# Patient Record
Sex: FEMALE | Race: WHITE | NOT HISPANIC OR LATINO | Employment: FULL TIME | URBAN - METROPOLITAN AREA
[De-identification: names, ages, dates, MRNs, and addresses within clinical notes are randomized per-mention and may not be internally consistent; named-entity substitution may affect disease eponyms.]

---

## 2022-08-04 PROBLEM — E66.812 CLASS 2 OBESITY DUE TO EXCESS CALORIES WITHOUT SERIOUS COMORBIDITY WITH BODY MASS INDEX (BMI) OF 36.0 TO 36.9 IN ADULT: Status: ACTIVE | Noted: 2022-08-04

## 2022-11-14 PROBLEM — M54.41 CHRONIC BILATERAL LOW BACK PAIN WITH SCIATICA: Status: ACTIVE | Noted: 2022-11-14

## 2022-11-14 PROBLEM — M46.1 SI JOINT ARTHRITIS: Status: ACTIVE | Noted: 2022-11-14

## 2022-11-14 PROBLEM — M54.42 CHRONIC BILATERAL LOW BACK PAIN WITH SCIATICA: Status: ACTIVE | Noted: 2022-11-14

## 2024-02-20 PROBLEM — E66.812 CLASS 2 OBESITY DUE TO EXCESS CALORIES WITHOUT SERIOUS COMORBIDITY WITH BODY MASS INDEX (BMI) OF 36.0 TO 36.9 IN ADULT: Status: RESOLVED | Noted: 2022-08-04 | Resolved: 2024-02-20

## 2024-09-06 ENCOUNTER — TELEPHONE (OUTPATIENT)
Dept: FAMILY MEDICINE CLINIC | Facility: CLINIC | Age: 44
End: 2024-09-06
Payer: COMMERCIAL

## 2024-09-06 RX ORDER — ONDANSETRON 4 MG/1
4 TABLET, ORALLY DISINTEGRATING ORAL EVERY 8 HOURS PRN
Qty: 30 TABLET | Refills: 0 | Status: SHIPPED | OUTPATIENT
Start: 2024-09-06

## 2024-09-10 RX ORDER — CIPROFLOXACIN 500 MG/1
500 TABLET, FILM COATED ORAL 2 TIMES DAILY
Qty: 14 TABLET | Refills: 0 | Status: SHIPPED | OUTPATIENT
Start: 2024-09-10 | End: 2024-09-10 | Stop reason: SDUPTHER

## 2024-09-10 RX ORDER — CIPROFLOXACIN 500 MG/1
500 TABLET, FILM COATED ORAL 2 TIMES DAILY
Qty: 14 TABLET | Refills: 0 | Status: SHIPPED | OUTPATIENT
Start: 2024-09-10 | End: 2024-09-17

## 2024-09-18 DIAGNOSIS — N39.0 URINARY TRACT INFECTION WITHOUT HEMATURIA, SITE UNSPECIFIED: Primary | ICD-10-CM

## 2024-09-20 LAB
BACTERIA UR CULT: NO GROWTH
BACTERIA UR CULT: NORMAL

## 2024-10-16 DIAGNOSIS — E11.9 CONTROLLED TYPE 2 DIABETES MELLITUS WITHOUT COMPLICATION, WITHOUT LONG-TERM CURRENT USE OF INSULIN: ICD-10-CM

## 2024-10-16 RX ORDER — SEMAGLUTIDE 2.68 MG/ML
2 INJECTION, SOLUTION SUBCUTANEOUS WEEKLY
Qty: 9 ML | Refills: 0 | Status: SHIPPED | OUTPATIENT
Start: 2024-10-16

## 2024-10-25 ENCOUNTER — FLU SHOT (OUTPATIENT)
Dept: FAMILY MEDICINE CLINIC | Facility: CLINIC | Age: 44
End: 2024-10-25
Payer: COMMERCIAL

## 2024-10-25 DIAGNOSIS — Z23 NEED FOR INFLUENZA VACCINATION: Primary | ICD-10-CM

## 2024-10-25 PROCEDURE — 90656 IIV3 VACC NO PRSV 0.5 ML IM: CPT | Performed by: FAMILY MEDICINE

## 2024-10-25 PROCEDURE — 90471 IMMUNIZATION ADMIN: CPT | Performed by: FAMILY MEDICINE

## 2024-11-12 DIAGNOSIS — S69.91XA INJURY OF RIGHT WRIST, INITIAL ENCOUNTER: Primary | ICD-10-CM

## 2024-11-14 ENCOUNTER — HOSPITAL ENCOUNTER (OUTPATIENT)
Dept: GENERAL RADIOLOGY | Facility: HOSPITAL | Age: 44
Discharge: HOME | End: 2024-11-14
Payer: COMMERCIAL

## 2024-11-14 ENCOUNTER — LAB (OUTPATIENT)
Dept: LAB | Facility: HOSPITAL | Age: 44
End: 2024-11-14
Payer: COMMERCIAL

## 2024-11-14 LAB
ABO GROUP BLD: NORMAL
ANION GAP SERPL CALCULATED.3IONS-SCNC: 10.1 MMOL/L (ref 5–15)
BLD GP AB SCN SERPL QL: NEGATIVE
BUN SERPL-MCNC: 13 MG/DL (ref 6–20)
BUN/CREAT SERPL: 15.3 (ref 7–25)
CALCIUM SPEC-SCNC: 9.1 MG/DL (ref 8.6–10.5)
CHLORIDE SERPL-SCNC: 105 MMOL/L (ref 98–107)
CO2 SERPL-SCNC: 23.9 MMOL/L (ref 22–29)
CREAT SERPL-MCNC: 0.85 MG/DL (ref 0.57–1)
DEPRECATED RDW RBC AUTO: 44.4 FL (ref 37–54)
EGFRCR SERPLBLD CKD-EPI 2021: 87.8 ML/MIN/1.73
ERYTHROCYTE [DISTWIDTH] IN BLOOD BY AUTOMATED COUNT: 13.8 % (ref 12.3–15.4)
GLUCOSE SERPL-MCNC: 75 MG/DL (ref 65–99)
HCG INTACT+B SERPL-ACNC: <1 MIU/ML
HCT VFR BLD AUTO: 40.6 % (ref 34–46.6)
HGB BLD-MCNC: 12.8 G/DL (ref 12–15.9)
MCH RBC QN AUTO: 28.1 PG (ref 26.6–33)
MCHC RBC AUTO-ENTMCNC: 31.5 G/DL (ref 31.5–35.7)
MCV RBC AUTO: 89 FL (ref 79–97)
PLATELET # BLD AUTO: 386 10*3/MM3 (ref 140–450)
PMV BLD AUTO: 10.2 FL (ref 6–12)
POTASSIUM SERPL-SCNC: 4.1 MMOL/L (ref 3.5–5.2)
RBC # BLD AUTO: 4.56 10*6/MM3 (ref 3.77–5.28)
RH BLD: POSITIVE
SODIUM SERPL-SCNC: 139 MMOL/L (ref 136–145)
T&S EXPIRATION DATE: NORMAL
WBC NRBC COR # BLD AUTO: 7.25 10*3/MM3 (ref 3.4–10.8)

## 2024-11-14 PROCEDURE — 85027 COMPLETE CBC AUTOMATED: CPT

## 2024-11-14 PROCEDURE — 36415 COLL VENOUS BLD VENIPUNCTURE: CPT

## 2024-11-14 PROCEDURE — 86901 BLOOD TYPING SEROLOGIC RH(D): CPT

## 2024-11-14 PROCEDURE — 86900 BLOOD TYPING SEROLOGIC ABO: CPT

## 2024-11-14 PROCEDURE — 84702 CHORIONIC GONADOTROPIN TEST: CPT

## 2024-11-14 PROCEDURE — 71046 X-RAY EXAM CHEST 2 VIEWS: CPT

## 2024-11-14 PROCEDURE — 86850 RBC ANTIBODY SCREEN: CPT

## 2024-11-14 PROCEDURE — 80048 BASIC METABOLIC PNL TOTAL CA: CPT

## 2024-11-24 ENCOUNTER — PREP FOR SURGERY (OUTPATIENT)
Dept: OTHER | Facility: HOSPITAL | Age: 44
End: 2024-11-24
Payer: COMMERCIAL

## 2024-11-24 NOTE — H&P
Gynecology History and Physical    Chief complaint Scheduled hysterectomy    Subjective     Patient is a 44 y.o. female presents with a longstanding history of heavy menses.  Has declined medical management.  She does not desire future childbearing and wants definitive surgery in the form of hysterectomy.  See office H&P there is no interval change.     Review of Systems   Pertinent items are noted in HPI    History    Surgical history:  LSC R distal salpingectomy d/t ectopic pregnancy    Obstetric history:   Term  x 2  Ectopic x 1  SAB x 1    Past Medical History:   Diagnosis Date    Acute left-sided low back pain with left-sided sciatica     Allergic     Annual physical exam 2021    Arthritis     Asthma     Body mass index (BMI) of 36.0 to 36.9 in adult     Body mass index (BMI) of 39.0 to 39.9 in adult 2022    Body mass index (BMI) of 39.0 to 39.9 in adult     Class 2 obesity due to excess calories without serious comorbidity with body mass index (BMI) of 36.0 to 36.9 in adult     Concern about STD in female without diagnosis     Depression     Elevated blood pressure reading in office without diagnosis of hypertension 2023    Elevated LDL cholesterol level 2023    Elevated temperature 2021    Encounter for general adult medical examination without abnormal findings 2013    Encounter for hepatitis C screening test for low risk patient     Encounter for screening for other disorder 2012    Establishing care with new doctor, encounter for 2021    Fatigue     Headache     High triglycerides 2023    Leg pain, right     Low back pain     Onychomycosis     Other fatigue     Screening cholesterol level 2022    Screening for diabetes mellitus     Screening for thyroid disorder     Screening mammogram for breast cancer     Sleepiness     Stress     Upper respiratory tract infection 2024    Wellness examination 2020     Past Surgical  History:   Procedure Laterality Date    EAR TUBES      ECTOPIC PREGNANCY Right 10/13/2014     Family History   Problem Relation Age of Onset    Cancer Mother         Cervix    Depression Mother     Bipolar disorder Mother     Cervical cancer Mother     Uterine cancer Mother     Hyperlipidemia Father     Diabetes Maternal Grandfather     Kidney disease Maternal Grandfather      Social History     Tobacco Use    Smoking status: Never     Passive exposure: Current    Smokeless tobacco: Never   Vaping Use    Vaping status: Never Used   Substance Use Topics    Alcohol use: Yes     Comment: Occasionally    Drug use: Never       Allergies  Allergies   Allergen Reactions    Codeine GI Intolerance    Measles, Mumps & Rubella Vac Swelling    Norco [Hydrocodone-Acetaminophen] GI Intolerance    Niacin Hives             Objective       Physical Exam:      General Appearance:    Alert, cooperative, in no acute distress   Abdomen:     Normal bowel sounds, no masses, soft non-tender, non-distended, no guarding, no rebound tenderness   Genitalia:    Uterus not enlarged, good descensus.    Extremities:   Moves all extremities well, no edema, no cyanosis, no              redness           Assessment & Plan   43 yo c menorrhagia desiring definitive surgical mgmt. Plan for LAVH c bilateral salpingectomy (has had R distal salpingectomy previously).      See office H&P for full informed consent there is no change.       Zhane Ca MD  11/24/24  07:36 EST

## 2024-11-25 ENCOUNTER — ANESTHESIA EVENT (OUTPATIENT)
Dept: PERIOP | Facility: HOSPITAL | Age: 44
End: 2024-11-25
Payer: COMMERCIAL

## 2024-11-25 NOTE — ANESTHESIA PREPROCEDURE EVALUATION
Anesthesia Evaluation     Patient summary reviewed and Nursing notes reviewed   NPO Solid Status: > 8 hours  NPO Liquid Status: > 8 hours           Airway   Mallampati: II  TM distance: >3 FB  Neck ROM: full  Possible difficult intubation  Dental - normal exam     Pulmonary - normal exam    breath sounds clear to auscultation  (+) lung cancer, asthma,recent URI, sleep apnea  Cardiovascular - normal exam  Exercise tolerance: unable to assess    ECG reviewed  Rhythm: regular  Rate: normal    (+) DVT, hyperlipidemia      Neuro/Psych  (+) headaches, numbness, psychiatric history Anxiety  GI/Hepatic/Renal/Endo    (+) obesity, diabetes mellitus    Musculoskeletal     (+) neck stiffness  Abdominal  - normal exam   Substance History - negative use     OB/GYN negative ob/gyn ROS         Other   arthritis,                 Anesthesia Plan    ASA 3     general     (Last dose Ozempic 15 Days ago.GETA)  intravenous induction     Anesthetic plan, risks, benefits, and alternatives have been provided, discussed and informed consent has been obtained with: patient.    CODE STATUS:         
normal...

## 2024-11-26 ENCOUNTER — HOSPITAL ENCOUNTER (OUTPATIENT)
Facility: HOSPITAL | Age: 44
Discharge: HOME | End: 2024-11-27
Attending: OBSTETRICS & GYNECOLOGY | Admitting: OBSTETRICS & GYNECOLOGY
Payer: COMMERCIAL

## 2024-11-26 ENCOUNTER — ANESTHESIA (OUTPATIENT)
Dept: PERIOP | Facility: HOSPITAL | Age: 44
End: 2024-11-26
Payer: COMMERCIAL

## 2024-11-26 DIAGNOSIS — Z90.710 STATUS POST VAGINAL HYSTERECTOMY: Primary | ICD-10-CM

## 2024-11-26 DIAGNOSIS — N92.0 EXCESSIVE OR FREQUENT MENSTRUATION: ICD-10-CM

## 2024-11-26 LAB — B-HCG UR QL: NEGATIVE

## 2024-11-26 PROCEDURE — 88307 TISSUE EXAM BY PATHOLOGIST: CPT | Performed by: OBSTETRICS & GYNECOLOGY

## 2024-11-26 PROCEDURE — 25010000002 HYDROMORPHONE 1 MG/ML SOLUTION: Performed by: NURSE ANESTHETIST, CERTIFIED REGISTERED

## 2024-11-26 PROCEDURE — G0378 HOSPITAL OBSERVATION PER HR: HCPCS

## 2024-11-26 PROCEDURE — 25010000002 BUPIVACAINE (PF) 0.25 % SOLUTION 30 ML VIAL: Performed by: OBSTETRICS & GYNECOLOGY

## 2024-11-26 PROCEDURE — 81025 URINE PREGNANCY TEST: CPT | Performed by: ANESTHESIOLOGY

## 2024-11-26 PROCEDURE — 25010000002 ONDANSETRON PER 1 MG: Performed by: NURSE ANESTHETIST, CERTIFIED REGISTERED

## 2024-11-26 PROCEDURE — 25810000003 LACTATED RINGERS PER 1000 ML: Performed by: OBSTETRICS & GYNECOLOGY

## 2024-11-26 PROCEDURE — 25010000002 MAGNESIUM SULFATE PER 500 MG OF MAGNESIUM: Performed by: NURSE ANESTHETIST, CERTIFIED REGISTERED

## 2024-11-26 PROCEDURE — 25810000003 SODIUM CHLORIDE 0.9 % SOLUTION: Performed by: OBSTETRICS & GYNECOLOGY

## 2024-11-26 PROCEDURE — 25010000002 MORPHINE PER 10 MG: Performed by: OBSTETRICS & GYNECOLOGY

## 2024-11-26 PROCEDURE — 25010000002 PROPOFOL 200 MG/20ML EMULSION: Performed by: NURSE ANESTHETIST, CERTIFIED REGISTERED

## 2024-11-26 PROCEDURE — 25010000002 LIDOCAINE PF 2% 2 % SOLUTION: Performed by: NURSE ANESTHETIST, CERTIFIED REGISTERED

## 2024-11-26 PROCEDURE — 25010000002 FENTANYL CITRATE (PF) 100 MCG/2ML SOLUTION: Performed by: NURSE ANESTHETIST, CERTIFIED REGISTERED

## 2024-11-26 PROCEDURE — 25010000002 DEXAMETHASONE SODIUM PHOSPHATE 20 MG/5ML SOLUTION: Performed by: NURSE ANESTHETIST, CERTIFIED REGISTERED

## 2024-11-26 PROCEDURE — 25010000002 VASOPRESSIN 20 UNIT/ML SOLUTION 1 ML VIAL: Performed by: OBSTETRICS & GYNECOLOGY

## 2024-11-26 PROCEDURE — 25010000002 DIPHENHYDRAMINE PER 50 MG: Performed by: ANESTHESIOLOGY

## 2024-11-26 PROCEDURE — 25010000002 SUGAMMADEX 200 MG/2ML SOLUTION: Performed by: NURSE ANESTHETIST, CERTIFIED REGISTERED

## 2024-11-26 PROCEDURE — 25810000003 LACTATED RINGERS PER 1000 ML: Performed by: NURSE ANESTHETIST, CERTIFIED REGISTERED

## 2024-11-26 PROCEDURE — 25010000002 CEFAZOLIN PER 500 MG: Performed by: OBSTETRICS & GYNECOLOGY

## 2024-11-26 PROCEDURE — 25010000002 FENTANYL CITRATE (PF) 50 MCG/ML SOLUTION: Performed by: NURSE ANESTHETIST, CERTIFIED REGISTERED

## 2024-11-26 PROCEDURE — 25010000002 KETOROLAC TROMETHAMINE PER 15 MG: Performed by: NURSE ANESTHETIST, CERTIFIED REGISTERED

## 2024-11-26 PROCEDURE — 25810000003 LACTATED RINGERS PER 1000 ML: Performed by: ANESTHESIOLOGY

## 2024-11-26 PROCEDURE — 63710000001 ONDANSETRON ODT 4 MG TABLET DISPERSIBLE: Performed by: OBSTETRICS & GYNECOLOGY

## 2024-11-26 RX ORDER — LABETALOL HYDROCHLORIDE 5 MG/ML
5 INJECTION, SOLUTION INTRAVENOUS
Status: DISCONTINUED | OUTPATIENT
Start: 2024-11-26 | End: 2024-11-26 | Stop reason: HOSPADM

## 2024-11-26 RX ORDER — NALOXONE HCL 0.4 MG/ML
0.1 VIAL (ML) INJECTION
Status: DISCONTINUED | OUTPATIENT
Start: 2024-11-26 | End: 2024-11-27 | Stop reason: HOSPADM

## 2024-11-26 RX ORDER — FLUMAZENIL 0.1 MG/ML
0.2 INJECTION INTRAVENOUS AS NEEDED
Status: DISCONTINUED | OUTPATIENT
Start: 2024-11-26 | End: 2024-11-26 | Stop reason: HOSPADM

## 2024-11-26 RX ORDER — HYDRALAZINE HYDROCHLORIDE 20 MG/ML
5 INJECTION INTRAMUSCULAR; INTRAVENOUS
Status: DISCONTINUED | OUTPATIENT
Start: 2024-11-26 | End: 2024-11-26 | Stop reason: HOSPADM

## 2024-11-26 RX ORDER — ONDANSETRON 2 MG/ML
4 INJECTION INTRAMUSCULAR; INTRAVENOUS EVERY 6 HOURS PRN
Status: DISCONTINUED | OUTPATIENT
Start: 2024-11-26 | End: 2024-11-27 | Stop reason: HOSPADM

## 2024-11-26 RX ORDER — SODIUM CHLORIDE, SODIUM LACTATE, POTASSIUM CHLORIDE, CALCIUM CHLORIDE 600; 310; 30; 20 MG/100ML; MG/100ML; MG/100ML; MG/100ML
1000 INJECTION, SOLUTION INTRAVENOUS CONTINUOUS
Status: DISCONTINUED | OUTPATIENT
Start: 2024-11-26 | End: 2024-11-26

## 2024-11-26 RX ORDER — MORPHINE SULFATE 1 MG/ML
INJECTION INTRAVENOUS CONTINUOUS
Status: DISPENSED | OUTPATIENT
Start: 2024-11-26 | End: 2024-11-27

## 2024-11-26 RX ORDER — SIMETHICONE 80 MG
80 TABLET,CHEWABLE ORAL 4 TIMES DAILY PRN
Status: DISCONTINUED | OUTPATIENT
Start: 2024-11-26 | End: 2024-11-27 | Stop reason: HOSPADM

## 2024-11-26 RX ORDER — PROPOFOL 10 MG/ML
INJECTION, EMULSION INTRAVENOUS AS NEEDED
Status: DISCONTINUED | OUTPATIENT
Start: 2024-11-26 | End: 2024-11-26 | Stop reason: SURG

## 2024-11-26 RX ORDER — FAMOTIDINE 20 MG/1
20 TABLET, FILM COATED ORAL 2 TIMES DAILY PRN
Status: DISCONTINUED | OUTPATIENT
Start: 2024-11-26 | End: 2024-11-27 | Stop reason: HOSPADM

## 2024-11-26 RX ORDER — DIPHENHYDRAMINE HYDROCHLORIDE 50 MG/ML
12.5 INJECTION INTRAMUSCULAR; INTRAVENOUS ONCE
Status: COMPLETED | OUTPATIENT
Start: 2024-11-26 | End: 2024-11-26

## 2024-11-26 RX ORDER — ONDANSETRON 2 MG/ML
INJECTION INTRAMUSCULAR; INTRAVENOUS AS NEEDED
Status: DISCONTINUED | OUTPATIENT
Start: 2024-11-26 | End: 2024-11-26 | Stop reason: SURG

## 2024-11-26 RX ORDER — FENTANYL CITRATE 50 UG/ML
50 INJECTION, SOLUTION INTRAMUSCULAR; INTRAVENOUS
Status: DISCONTINUED | OUTPATIENT
Start: 2024-11-26 | End: 2024-11-26 | Stop reason: HOSPADM

## 2024-11-26 RX ORDER — MAGNESIUM SULFATE HEPTAHYDRATE 500 MG/ML
INJECTION, SOLUTION INTRAMUSCULAR; INTRAVENOUS AS NEEDED
Status: DISCONTINUED | OUTPATIENT
Start: 2024-11-26 | End: 2024-11-26 | Stop reason: SURG

## 2024-11-26 RX ORDER — IBUPROFEN 600 MG/1
600 TABLET, FILM COATED ORAL EVERY 6 HOURS
Status: DISCONTINUED | OUTPATIENT
Start: 2024-11-26 | End: 2024-11-27 | Stop reason: HOSPADM

## 2024-11-26 RX ORDER — FENTANYL CITRATE 50 UG/ML
INJECTION, SOLUTION INTRAMUSCULAR; INTRAVENOUS AS NEEDED
Status: DISCONTINUED | OUTPATIENT
Start: 2024-11-26 | End: 2024-11-26 | Stop reason: SURG

## 2024-11-26 RX ORDER — IPRATROPIUM BROMIDE AND ALBUTEROL SULFATE 2.5; .5 MG/3ML; MG/3ML
3 SOLUTION RESPIRATORY (INHALATION) ONCE AS NEEDED
Status: DISCONTINUED | OUTPATIENT
Start: 2024-11-26 | End: 2024-11-26 | Stop reason: HOSPADM

## 2024-11-26 RX ORDER — EPHEDRINE SULFATE 5 MG/ML
5 INJECTION INTRAVENOUS ONCE AS NEEDED
Status: DISCONTINUED | OUTPATIENT
Start: 2024-11-26 | End: 2024-11-26 | Stop reason: HOSPADM

## 2024-11-26 RX ORDER — LIDOCAINE HYDROCHLORIDE 10 MG/ML
0.5 INJECTION, SOLUTION EPIDURAL; INFILTRATION; INTRACAUDAL; PERINEURAL ONCE AS NEEDED
Status: DISCONTINUED | OUTPATIENT
Start: 2024-11-26 | End: 2024-11-26 | Stop reason: HOSPADM

## 2024-11-26 RX ORDER — DOCUSATE SODIUM 100 MG/1
100 CAPSULE, LIQUID FILLED ORAL 2 TIMES DAILY
Status: DISCONTINUED | OUTPATIENT
Start: 2024-11-26 | End: 2024-11-27 | Stop reason: HOSPADM

## 2024-11-26 RX ORDER — BUPIVACAINE HYDROCHLORIDE 2.5 MG/ML
INJECTION, SOLUTION EPIDURAL; INFILTRATION; INTRACAUDAL
Status: DISPENSED
Start: 2024-11-26 | End: 2024-11-26

## 2024-11-26 RX ORDER — SODIUM CHLORIDE, SODIUM LACTATE, POTASSIUM CHLORIDE, CALCIUM CHLORIDE 600; 310; 30; 20 MG/100ML; MG/100ML; MG/100ML; MG/100ML
125 INJECTION, SOLUTION INTRAVENOUS CONTINUOUS
Status: DISPENSED | OUTPATIENT
Start: 2024-11-26 | End: 2024-11-26

## 2024-11-26 RX ORDER — ONDANSETRON 2 MG/ML
4 INJECTION INTRAMUSCULAR; INTRAVENOUS ONCE AS NEEDED
Status: COMPLETED | OUTPATIENT
Start: 2024-11-26 | End: 2024-11-26

## 2024-11-26 RX ORDER — OXYCODONE HYDROCHLORIDE 5 MG/1
5 TABLET ORAL EVERY 4 HOURS PRN
Status: DISCONTINUED | OUTPATIENT
Start: 2024-11-26 | End: 2024-11-27 | Stop reason: HOSPADM

## 2024-11-26 RX ORDER — NALOXONE HCL 0.4 MG/ML
0.4 VIAL (ML) INJECTION AS NEEDED
Status: DISCONTINUED | OUTPATIENT
Start: 2024-11-26 | End: 2024-11-26 | Stop reason: HOSPADM

## 2024-11-26 RX ORDER — ALBUTEROL SULFATE 0.83 MG/ML
2.5 SOLUTION RESPIRATORY (INHALATION) EVERY 4 HOURS PRN
Status: DISCONTINUED | OUTPATIENT
Start: 2024-11-26 | End: 2024-11-27 | Stop reason: HOSPADM

## 2024-11-26 RX ORDER — KETOROLAC TROMETHAMINE 30 MG/ML
INJECTION, SOLUTION INTRAMUSCULAR; INTRAVENOUS AS NEEDED
Status: DISCONTINUED | OUTPATIENT
Start: 2024-11-26 | End: 2024-11-26 | Stop reason: SURG

## 2024-11-26 RX ORDER — ALBUTEROL SULFATE 90 UG/1
2 INHALANT RESPIRATORY (INHALATION) EVERY 4 HOURS PRN
Status: DISCONTINUED | OUTPATIENT
Start: 2024-11-26 | End: 2024-11-26

## 2024-11-26 RX ORDER — ONDANSETRON 4 MG/1
4 TABLET, ORALLY DISINTEGRATING ORAL EVERY 6 HOURS PRN
Status: DISCONTINUED | OUTPATIENT
Start: 2024-11-26 | End: 2024-11-27 | Stop reason: HOSPADM

## 2024-11-26 RX ORDER — SODIUM CHLORIDE, SODIUM LACTATE, POTASSIUM CHLORIDE, CALCIUM CHLORIDE 600; 310; 30; 20 MG/100ML; MG/100ML; MG/100ML; MG/100ML
INJECTION, SOLUTION INTRAVENOUS CONTINUOUS PRN
Status: DISCONTINUED | OUTPATIENT
Start: 2024-11-26 | End: 2024-11-26 | Stop reason: SURG

## 2024-11-26 RX ORDER — VASOPRESSIN 20 U/ML
INJECTION PARENTERAL
Status: DISPENSED
Start: 2024-11-26 | End: 2024-11-26

## 2024-11-26 RX ORDER — ACETAMINOPHEN 325 MG/1
650 TABLET ORAL ONCE AS NEEDED
Status: DISCONTINUED | OUTPATIENT
Start: 2024-11-26 | End: 2024-11-26 | Stop reason: HOSPADM

## 2024-11-26 RX ORDER — DEXAMETHASONE SODIUM PHOSPHATE 4 MG/ML
INJECTION, SOLUTION INTRA-ARTICULAR; INTRALESIONAL; INTRAMUSCULAR; INTRAVENOUS; SOFT TISSUE AS NEEDED
Status: DISCONTINUED | OUTPATIENT
Start: 2024-11-26 | End: 2024-11-26 | Stop reason: SURG

## 2024-11-26 RX ORDER — SODIUM CHLORIDE 9 MG/ML
30 INJECTION, SOLUTION INTRAVENOUS CONTINUOUS PRN
Status: DISPENSED | OUTPATIENT
Start: 2024-11-26 | End: 2024-11-27

## 2024-11-26 RX ORDER — MEPERIDINE HYDROCHLORIDE 25 MG/ML
12.5 INJECTION INTRAMUSCULAR; INTRAVENOUS; SUBCUTANEOUS
Status: DISCONTINUED | OUTPATIENT
Start: 2024-11-26 | End: 2024-11-26 | Stop reason: HOSPADM

## 2024-11-26 RX ORDER — SODIUM CHLORIDE 0.9 % (FLUSH) 0.9 %
10 SYRINGE (ML) INJECTION AS NEEDED
Status: DISCONTINUED | OUTPATIENT
Start: 2024-11-26 | End: 2024-11-26 | Stop reason: HOSPADM

## 2024-11-26 RX ORDER — NALBUPHINE HYDROCHLORIDE 10 MG/ML
5 INJECTION INTRAMUSCULAR; INTRAVENOUS; SUBCUTANEOUS
Status: DISCONTINUED | OUTPATIENT
Start: 2024-11-26 | End: 2024-11-26 | Stop reason: HOSPADM

## 2024-11-26 RX ORDER — ROCURONIUM BROMIDE 10 MG/ML
INJECTION, SOLUTION INTRAVENOUS AS NEEDED
Status: DISCONTINUED | OUTPATIENT
Start: 2024-11-26 | End: 2024-11-26 | Stop reason: SURG

## 2024-11-26 RX ORDER — OXYCODONE HYDROCHLORIDE 5 MG/1
5 TABLET ORAL ONCE AS NEEDED
Status: COMPLETED | OUTPATIENT
Start: 2024-11-26 | End: 2024-11-26

## 2024-11-26 RX ADMIN — DIPHENHYDRAMINE HYDROCHLORIDE 12.5 MG: 50 INJECTION, SOLUTION INTRAMUSCULAR; INTRAVENOUS at 09:56

## 2024-11-26 RX ADMIN — SODIUM CHLORIDE, SODIUM LACTATE, POTASSIUM CHLORIDE, AND CALCIUM CHLORIDE: .6; .31; .03; .02 INJECTION, SOLUTION INTRAVENOUS at 07:27

## 2024-11-26 RX ADMIN — HYDROMORPHONE HYDROCHLORIDE 1 MG: 1 INJECTION, SOLUTION INTRAMUSCULAR; INTRAVENOUS; SUBCUTANEOUS at 08:43

## 2024-11-26 RX ADMIN — DIPHENHYDRAMINE HYDROCHLORIDE 12.5 MG: 50 INJECTION, SOLUTION INTRAMUSCULAR; INTRAVENOUS at 10:31

## 2024-11-26 RX ADMIN — ROCURONIUM BROMIDE 50 MG: 10 INJECTION, SOLUTION INTRAVENOUS at 07:34

## 2024-11-26 RX ADMIN — FENTANYL CITRATE 50 MCG: 50 INJECTION, SOLUTION INTRAMUSCULAR; INTRAVENOUS at 07:50

## 2024-11-26 RX ADMIN — SODIUM CHLORIDE 30 ML/HR: 9 INJECTION, SOLUTION INTRAVENOUS at 23:02

## 2024-11-26 RX ADMIN — SODIUM CHLORIDE, SODIUM LACTATE, POTASSIUM CHLORIDE, CALCIUM CHLORIDE 1000 ML: 20; 30; 600; 310 INJECTION, SOLUTION INTRAVENOUS at 07:24

## 2024-11-26 RX ADMIN — LIDOCAINE HYDROCHLORIDE 100 MG: 20 INJECTION, SOLUTION EPIDURAL; INFILTRATION; INTRACAUDAL; PERINEURAL at 07:34

## 2024-11-26 RX ADMIN — PROPOFOL 200 MG: 10 INJECTION, EMULSION INTRAVENOUS at 07:34

## 2024-11-26 RX ADMIN — FENTANYL CITRATE 50 MCG: 50 INJECTION, SOLUTION INTRAMUSCULAR; INTRAVENOUS at 07:34

## 2024-11-26 RX ADMIN — SUGAMMADEX 200 MG: 100 INJECTION, SOLUTION INTRAVENOUS at 08:41

## 2024-11-26 RX ADMIN — KETOROLAC TROMETHAMINE 30 MG: 30 INJECTION, SOLUTION INTRAMUSCULAR at 08:47

## 2024-11-26 RX ADMIN — MORPHINE SULFATE: 1 INJECTION INTRAVENOUS at 14:46

## 2024-11-26 RX ADMIN — ONDANSETRON 4 MG: 4 TABLET, ORALLY DISINTEGRATING ORAL at 14:56

## 2024-11-26 RX ADMIN — DEXAMETHASONE SODIUM PHOSPHATE 8 MG: 4 INJECTION, SOLUTION INTRAMUSCULAR; INTRAVENOUS at 07:45

## 2024-11-26 RX ADMIN — SODIUM CHLORIDE 2000 MG: 900 INJECTION INTRAVENOUS at 07:22

## 2024-11-26 RX ADMIN — IBUPROFEN 600 MG: 600 TABLET, FILM COATED ORAL at 20:52

## 2024-11-26 RX ADMIN — PROPOFOL 25 MCG/KG/MIN: 10 INJECTION, EMULSION INTRAVENOUS at 07:38

## 2024-11-26 RX ADMIN — DOCUSATE SODIUM 100 MG: 100 CAPSULE, LIQUID FILLED ORAL at 20:52

## 2024-11-26 RX ADMIN — ONDANSETRON 4 MG: 2 INJECTION, SOLUTION INTRAMUSCULAR; INTRAVENOUS at 07:45

## 2024-11-26 RX ADMIN — ONDANSETRON 4 MG: 2 INJECTION, SOLUTION INTRAMUSCULAR; INTRAVENOUS at 12:03

## 2024-11-26 RX ADMIN — FENTANYL CITRATE 50 MCG: 50 INJECTION, SOLUTION INTRAMUSCULAR; INTRAVENOUS at 10:09

## 2024-11-26 RX ADMIN — SODIUM CHLORIDE, POTASSIUM CHLORIDE, SODIUM LACTATE AND CALCIUM CHLORIDE 125 ML/HR: 600; 310; 30; 20 INJECTION, SOLUTION INTRAVENOUS at 14:46

## 2024-11-26 RX ADMIN — MAGNESIUM SULFATE HEPTAHYDRATE 1 G: 500 INJECTION, SOLUTION INTRAMUSCULAR; INTRAVENOUS at 07:45

## 2024-11-26 RX ADMIN — OXYCODONE HYDROCHLORIDE 5 MG: 5 TABLET ORAL at 12:03

## 2024-11-26 RX ADMIN — HYDROMORPHONE HYDROCHLORIDE 0.5 MG: 1 INJECTION, SOLUTION INTRAMUSCULAR; INTRAVENOUS; SUBCUTANEOUS at 09:42

## 2024-11-26 RX ADMIN — VORTIOXETINE 20 MG: 20 TABLET, FILM COATED ORAL at 20:52

## 2024-11-26 NOTE — ANESTHESIA POSTPROCEDURE EVALUATION
Patient: Natividad CUNHA    Procedure Summary       Date: 11/26/24 Room / Location: [facility] OR 04 / [facility] MAIN OR    Anesthesia Start: 0727 Anesthesia Stop: 0849    Procedure: TOTAL VAGINAL HYSTERECTOMY WITH LEFT SALPINGECTOMY (Left: Vagina) Diagnosis:       Excessive or frequent menstruation      (Excessive or frequent menstruation [N92.0])    Surgeons: Zhane Ca MD Provider: Ray Renee MD    Anesthesia Type: general ASA Status: 3            Anesthesia Type: general    Vitals  Vitals Value Taken Time   /66 11/26/24 1235   Temp 97.7 °F (36.5 °C) 11/26/24 1235   Pulse 58 11/26/24 1236   Resp 12 11/26/24 1235   SpO2 98 % 11/26/24 1236   Vitals shown include unfiled device data.        Post Anesthesia Care and Evaluation    Patient location during evaluation: PACU  Patient participation: complete - patient participated  Level of consciousness: awake  Pain scale: See nurse's notes for pain score.  Pain management: adequate    Airway patency: patent  Anesthetic complications: No anesthetic complications  PONV Status: none  Cardiovascular status: acceptable  Respiratory status: acceptable and spontaneous ventilation  Hydration status: acceptable    Comments: Patient seen and examined postoperatively; vital signs stable; SpO2 greater than or equal to 90%; cardiopulmonary status stable; nausea/vomiting adequately controlled; pain adequately controlled; no apparent anesthesia complications; patient discharged from anesthesia care when discharge criteria were met     HD (TTS), recent   - renal following for HD  - lytes acceptable  - iron studies c/w AOCD  - c/w sevelamer 1600 TID, renal diet, vit D  - indwelling SPC, UA positive likely colonization HD (TTS), recent   - renal following for HD  - lytes acceptable  - iron studies c/w AOCD  - c/w sevelamer 1600 TID, renal diet, vit D  - indwelling SPC, UA positive MRSA, likely colonization  hyperphos - on Renvela  MRSA nasal swab - mupirocin nasal oint 5d HD (TTS), recent   - renal following for HD  - lytes acceptable  - iron studies c/w AOCD  - c/w sevelamer 1600 TID, renal diet, vit D  - indwelling SPC, UA positive MRSA, likely colonization  hyperphos - on Renvela  MRSA nasal swab - mupirocin nasal oint 5d  pt asking for IV Benadryl for HD, d/w RN no indication for IV at this time, reviewed med rec uses PO Benadryl at [facility], c/w PO Benadryl before HD and PRN

## 2024-11-26 NOTE — OP NOTE
Gynecology Operative Note    Date of Service:  11/26/24    Pre-operative diagnosis(es): Menorrhagia     Post-operative diagnosis(es): same   Procedure(s): Total Vaginal Hysterectomy with Left salpingectomy         Surgeon:    Dr. Zhane Ca   Assistant: None     EBL: 50cc   Antibiotics: cefazolin (Ancef) ordered on call to OR     Anesthesia:  General Anesthesia       Objective      Operative findings: Uterus not enlarged, ovaries visually normal bilaterally, normal left fallopian tube     Description of Procedure:   The patient was seen at the pre-op area. Risks, benefits, indication, and alternatives of the procedure were reviewed with the patient. The patient agreed to the procedure and signed the consent form.  The patient was taken to the OR with IV fluid running and pneumatic compression stockings applied to the lower extremities. General anesthesia was obtained.   The patient was placed in the dorsal lithotomy position with Chay-type stirrups. The patient was prepared and draped. A More catheter was inserted, and the bladder was emptied.    A weighted speculum was placed into the vagina.The cervix was grasped with the Graham tenaculum.  The area of the uterosacral ligaments and cardinal ligaments were injected with a solution of 1/4% Marcaine with 5 units of Vasopressin. The posterior cul-de-sac was then entered sharply with Chang scissors . The clear peritoneal fluid was noted. The posterior vagina/ peritoneum were tagged with a suture of 2.0 Vicryl which was used throughout the procedure. The weighted speculum was inserted into the posterior cul-de-sac.  The uterosacral ligaments were bilaterally clamped, cut, and suture-ligated. The vaginal mucosa overlying the cervix was incised with the scalpel and the bilateral bladder pillars were clamped, cut and suture ligated until the anterior cul de sac could be entered sharply and confirmation of entrance into the cul de sac was performed by visualization of  bowel fat.  The cardinal ligaments were then clamped, cut, and suture ligated. The broad and round ligaments were then clamped, cut, and suture-ligated. The uterus was delivered posteriorly  and then the utero-ovarian ligaments clamped, cut, and doubly ligated. The uterus was removed through the vagina. Ovaries and tubes were found to be normal.     The left fallopian tube was grasped with a Darius clamp and clamped, excised and the pedicle ligated with a free tie of 2.0 Vicryl.      Examination of all the pedicles revealed good hemostasis.   The peritoneum was closed with a midline suture of 2.0 Vicryl and bilateral purse string sutures were performed to close the remainder of the peritoneum bilaterally.    The uterosacral ligaments were plicated to the posterior vagina and peritoneum using 0 Vicryl.    The posterior and anterior vagina were closed with 2.0 vicryl.   All instruments were removed from the vagina, and all counts were correct times two. The patient was taken to the recovery room in a stable condition.     Specimens removed:   Uterus, left fallopian tube     Complications:   none     Condition:   stable     Disposition:   to PACU and then admit to  medical - surgical floor               Zhane Ca MD  11/26/24  07:37 EST

## 2024-11-26 NOTE — ANESTHESIA POSTPROCEDURE EVALUATION
Patient: Natividad CUNHA    Procedure Summary       Date: 11/26/24 Room / Location: [facility] OR 04 / [facility] MAIN OR    Anesthesia Start: 0727 Anesthesia Stop: 0849    Procedure: TOTAL VAGINAL HYSTERECTOMY WITH LEFT SALPINGECTOMY (Left: Vagina) Diagnosis:       Excessive or frequent menstruation      (Excessive or frequent menstruation [N92.0])    Surgeons: Zhane Ca MD Provider: Ray Renee MD    Anesthesia Type: general ASA Status: 3            Anesthesia Type: general    Vitals  Vitals Value Taken Time   /66 11/26/24 1235   Temp 97.7 °F (36.5 °C) 11/26/24 1235   Pulse 58 11/26/24 1236   Resp 12 11/26/24 1235   SpO2 98 % 11/26/24 1236   Vitals shown include unfiled device data.        Post Anesthesia Care and Evaluation    Patient location during evaluation: PACU  Patient participation: complete - patient participated  Level of consciousness: awake  Pain scale: See nurse's notes for pain score.  Pain management: adequate    Airway patency: patent  Anesthetic complications: No anesthetic complications  PONV Status: none  Cardiovascular status: acceptable  Respiratory status: acceptable and spontaneous ventilation  Hydration status: acceptable    Comments: Patient seen and examined postoperatively; vital signs stable; SpO2 greater than or equal to 90%; cardiopulmonary status stable; nausea/vomiting adequately controlled; pain adequately controlled; no apparent anesthesia complications; patient discharged from anesthesia care when discharge criteria were met    Post op itching. She has a hitory of this. Benadryl and Nubaine given

## 2024-11-26 NOTE — ANESTHESIA PROCEDURE NOTES
Airway  Urgency: elective    Date/Time: 11/26/2024 7:38 AM  Airway not difficult    General Information and Staff    Patient location during procedure: OR  CRNA/CAA: Chuyita Sawant CRNA    Indications and Patient Condition  Indications for airway management: airway protection    Preoxygenated: yes (pt pre-O2 with 100% O2)  Mask difficulty assessment: 2 - vent by mask + OA or adjuvant +/- NMBA (easy BMV )    Final Airway Details  Final airway type: endotracheal airway      Successful airway: ETT  Cuffed: yes   Successful intubation technique: video laryngoscopy  Facilitating devices/methods: intubating stylet  Endotracheal tube insertion site: oral  Blade: Marielena  Blade size: 4  ETT size (mm): 7.0  Cormack-Lehane Classification: grade I - full view of glottis  Placement verified by: chest auscultation and capnometry   Cuff volume (mL): 7  Measured from: lips  ETT/EBT  to lips (cm): 22  Number of attempts at approach: 1  Assessment: lips, teeth, and gum same as pre-op and atraumatic intubation    Additional Comments  ATOETx1. No change in dentition.

## 2024-11-27 VITALS
SYSTOLIC BLOOD PRESSURE: 108 MMHG | TEMPERATURE: 98.1 F | WEIGHT: 191 LBS | DIASTOLIC BLOOD PRESSURE: 72 MMHG | HEART RATE: 75 BPM | BODY MASS INDEX: 30.7 KG/M2 | HEIGHT: 66 IN | RESPIRATION RATE: 18 BRPM | OXYGEN SATURATION: 99 %

## 2024-11-27 LAB
BASOPHILS # BLD AUTO: 0.04 10*3/MM3 (ref 0–0.2)
BASOPHILS # BLD AUTO: 0.06 10*3/MM3 (ref 0–0.2)
BASOPHILS NFR BLD AUTO: 0.3 % (ref 0–1.5)
BASOPHILS NFR BLD AUTO: 0.6 % (ref 0–1.5)
DEPRECATED RDW RBC AUTO: 46.3 FL (ref 37–54)
DEPRECATED RDW RBC AUTO: 47.8 FL (ref 37–54)
EOSINOPHIL # BLD AUTO: 0.05 10*3/MM3 (ref 0–0.4)
EOSINOPHIL # BLD AUTO: 0.09 10*3/MM3 (ref 0–0.4)
EOSINOPHIL NFR BLD AUTO: 0.4 % (ref 0.3–6.2)
EOSINOPHIL NFR BLD AUTO: 0.9 % (ref 0.3–6.2)
ERYTHROCYTE [DISTWIDTH] IN BLOOD BY AUTOMATED COUNT: 13.7 % (ref 12.3–15.4)
ERYTHROCYTE [DISTWIDTH] IN BLOOD BY AUTOMATED COUNT: 13.9 % (ref 12.3–15.4)
GLUCOSE BLDC GLUCOMTR-MCNC: 113 MG/DL (ref 70–105)
HCT VFR BLD AUTO: 32.9 % (ref 34–46.6)
HCT VFR BLD AUTO: 35.6 % (ref 34–46.6)
HGB BLD-MCNC: 10.2 G/DL (ref 12–15.9)
HGB BLD-MCNC: 11.4 G/DL (ref 12–15.9)
IMM GRANULOCYTES # BLD AUTO: 0.03 10*3/MM3 (ref 0–0.05)
IMM GRANULOCYTES # BLD AUTO: 0.04 10*3/MM3 (ref 0–0.05)
IMM GRANULOCYTES NFR BLD AUTO: 0.3 % (ref 0–0.5)
IMM GRANULOCYTES NFR BLD AUTO: 0.4 % (ref 0–0.5)
LAB AP CASE REPORT: NORMAL
LYMPHOCYTES # BLD AUTO: 2.06 10*3/MM3 (ref 0.7–3.1)
LYMPHOCYTES # BLD AUTO: 2.51 10*3/MM3 (ref 0.7–3.1)
LYMPHOCYTES NFR BLD AUTO: 17.9 % (ref 19.6–45.3)
LYMPHOCYTES NFR BLD AUTO: 24.5 % (ref 19.6–45.3)
MCH RBC QN AUTO: 28.3 PG (ref 26.6–33)
MCH RBC QN AUTO: 29.7 PG (ref 26.6–33)
MCHC RBC AUTO-ENTMCNC: 31 G/DL (ref 31.5–35.7)
MCHC RBC AUTO-ENTMCNC: 32 G/DL (ref 31.5–35.7)
MCV RBC AUTO: 91.4 FL (ref 79–97)
MCV RBC AUTO: 92.7 FL (ref 79–97)
MONOCYTES # BLD AUTO: 0.66 10*3/MM3 (ref 0.1–0.9)
MONOCYTES # BLD AUTO: 0.7 10*3/MM3 (ref 0.1–0.9)
MONOCYTES NFR BLD AUTO: 5.7 % (ref 5–12)
MONOCYTES NFR BLD AUTO: 6.8 % (ref 5–12)
NEUTROPHILS NFR BLD AUTO: 6.86 10*3/MM3 (ref 1.7–7)
NEUTROPHILS NFR BLD AUTO: 66.8 % (ref 42.7–76)
NEUTROPHILS NFR BLD AUTO: 75.4 % (ref 42.7–76)
NEUTROPHILS NFR BLD AUTO: 8.66 10*3/MM3 (ref 1.7–7)
NRBC BLD AUTO-RTO: 0 /100 WBC (ref 0–0.2)
NRBC BLD AUTO-RTO: 0 /100 WBC (ref 0–0.2)
PATH REPORT.FINAL DX SPEC: NORMAL
PATH REPORT.GROSS SPEC: NORMAL
PLATELET # BLD AUTO: 322 10*3/MM3 (ref 140–450)
PLATELET # BLD AUTO: 323 10*3/MM3 (ref 140–450)
PMV BLD AUTO: 10.1 FL (ref 6–12)
PMV BLD AUTO: 9.4 FL (ref 6–12)
RBC # BLD AUTO: 3.6 10*6/MM3 (ref 3.77–5.28)
RBC # BLD AUTO: 3.84 10*6/MM3 (ref 3.77–5.28)
WBC NRBC COR # BLD AUTO: 10.26 10*3/MM3 (ref 3.4–10.8)
WBC NRBC COR # BLD AUTO: 11.5 10*3/MM3 (ref 3.4–10.8)

## 2024-11-27 PROCEDURE — 25810000003 LACTATED RINGERS SOLUTION: Performed by: OBSTETRICS & GYNECOLOGY

## 2024-11-27 PROCEDURE — 85025 COMPLETE CBC W/AUTO DIFF WBC: CPT | Performed by: OBSTETRICS & GYNECOLOGY

## 2024-11-27 PROCEDURE — 82948 REAGENT STRIP/BLOOD GLUCOSE: CPT

## 2024-11-27 RX ORDER — ACETAMINOPHEN 325 MG/1
650 TABLET ORAL EVERY 6 HOURS PRN
Status: DISCONTINUED | OUTPATIENT
Start: 2024-11-27 | End: 2024-11-27 | Stop reason: HOSPADM

## 2024-11-27 RX ORDER — OXYCODONE HYDROCHLORIDE 5 MG/1
5 TABLET ORAL EVERY 6 HOURS PRN
Qty: 12 TABLET | Refills: 0 | Status: SHIPPED | OUTPATIENT
Start: 2024-11-27 | End: 2024-12-09

## 2024-11-27 RX ORDER — SCOLOPAMINE TRANSDERMAL SYSTEM 1 MG/1
1 PATCH, EXTENDED RELEASE TRANSDERMAL
Status: DISCONTINUED | OUTPATIENT
Start: 2024-11-27 | End: 2024-11-27 | Stop reason: HOSPADM

## 2024-11-27 RX ADMIN — SCOPALAMINE 1 PATCH: 1 PATCH, EXTENDED RELEASE TRANSDERMAL at 12:19

## 2024-11-27 RX ADMIN — IBUPROFEN 600 MG: 600 TABLET, FILM COATED ORAL at 03:33

## 2024-11-27 RX ADMIN — IBUPROFEN 600 MG: 600 TABLET, FILM COATED ORAL at 08:25

## 2024-11-27 RX ADMIN — DOCUSATE SODIUM 100 MG: 100 CAPSULE, LIQUID FILLED ORAL at 08:25

## 2024-11-27 RX ADMIN — OXYCODONE 5 MG: 5 TABLET ORAL at 05:14

## 2024-11-27 RX ADMIN — SODIUM CHLORIDE, POTASSIUM CHLORIDE, SODIUM LACTATE AND CALCIUM CHLORIDE 500 ML: 600; 310; 30; 20 INJECTION, SOLUTION INTRAVENOUS at 12:19

## 2024-11-27 RX ADMIN — ACETAMINOPHEN 650 MG: 325 TABLET, FILM COATED ORAL at 12:18

## 2024-11-27 RX ADMIN — IBUPROFEN 600 MG: 600 TABLET, FILM COATED ORAL at 15:59

## 2024-11-27 NOTE — DISCHARGE SUMMARY
Gynecology Discharge Summary    Date of Discharge:  11/27/2024    Discharge Diagnosis: S/p TVH     Presenting Problem/History of Present Illness:  Active Hospital Problems    Diagnosis  POA    **Status post vaginal hysterectomy [Z90.710]  Unknown      Resolved Hospital Problems   No resolved problems to display.          Hospital Course:  Patient is a 44 y.o. female presented with scheduled hysterectomy, see OP note for details.  Surgery was uncomplicated and she did well postop.  She was stable for d/c on POD #1, vitals were all normal as was postop CBC.  She was feeling somewhat dizzy prior to breakfast, states dizziness started c meds in preop.  Will encourage hydration and see how she feels after eating.  She is able to ambulate and pain is controlled on po medications.  She is voiding spontaneously and has no other concerns.  Postop instructions reviewed.      Procedures Performed:    Procedure(s):  TOTAL VAGINAL HYSTERECTOMY WITH LEFT SALPINGECTOMY  -------------------         Pertinent Test Results:   Lab Results (last 72 hours)       Procedure Component Value Units Date/Time    CBC & Differential [[id number]]  (Abnormal) Collected: 11/27/24 0608    Specimen: Blood from Arm, Right Updated: 11/27/24 0655    Narrative:      The following orders were created for panel order CBC & Differential.  Procedure                               Abnormality         Status                     ---------                               -----------         ------                     CBC Auto Differential[[id number]]        Abnormal            Final result                 Please view results for these tests on the individual orders.    CBC Auto Differential [[id number]]  (Abnormal) Collected: 11/27/24 0608    Specimen: Blood from Arm, Right Updated: 11/27/24 0655     WBC 11.50 10*3/mm3      RBC 3.60 10*6/mm3      Hemoglobin 10.2 g/dL      Hematocrit 32.9 %      MCV 91.4 fL      MCH 28.3 pg      MCHC 31.0 g/dL      RDW 13.7 %       RDW-SD 46.3 fl      MPV 10.1 fL      Platelets 323 10*3/mm3      Neutrophil % 75.4 %      Lymphocyte % 17.9 %      Monocyte % 5.7 %      Eosinophil % 0.4 %      Basophil % 0.3 %      Immature Grans % 0.3 %      Neutrophils, Absolute 8.66 10*3/mm3      Lymphocytes, Absolute 2.06 10*3/mm3      Monocytes, Absolute 0.66 10*3/mm3      Eosinophils, Absolute 0.05 10*3/mm3      Basophils, Absolute 0.04 10*3/mm3      Immature Grans, Absolute 0.03 10*3/mm3      nRBC 0.0 /100 WBC     Tissue Pathology Exam [[id number]] Collected: 11/26/24 0832    Specimen: Tissue from Uterus with Fallopian Tubes Updated: 11/26/24 0935    Pregnancy, Urine - Urine, Clean Catch [[id number]]  (Normal) Collected: 11/26/24 0602    Specimen: Urine, Clean Catch Updated: 11/26/24 0616     HCG, Urine QL Negative            Condition on Discharge:  Good    Vital Signs:  Vitals:    11/26/24 2333 11/27/24 0112 11/27/24 0332 11/27/24 0734   BP: 93/54  93/56 95/58   BP Location: Right arm  Right arm Right arm   Patient Position: Lying  Lying Lying   Pulse: 72  67 67   Resp:  16 16 16   Temp: 97.7 °F (36.5 °C)  97.2 °F (36.2 °C) 98.1 °F (36.7 °C)   TempSrc: Oral  Oral Oral   SpO2: 94%  94% 98%   Weight: 86.6 kg (191 lb)      Height:           Physical Exam:     General Appearance:    Alert, cooperative, in no acute distress   Abdomen:     Normal bowel sounds, no masses, no organomegaly, soft        non-tender, non-distended, no guarding, no rebound                 tenderness   Extremities:   Moves all extremities well, no edema, no cyanosis, no             redness       Discharge Disposition:  Home    Discharge Medications:     Discharge Medications        New Medications        Instructions Start Date   oxyCODONE 5 MG immediate release tablet  Commonly known as: ROXICODONE   5 mg, Oral, Every 6 Hours PRN             Changes to Medications        Instructions Start Date   Trintellix 20 MG tablet  Generic drug: Vortioxetine HBr  What changed: when to take this    20 mg, Oral, Daily With Breakfast             Continue These Medications        Instructions Start Date   albuterol sulfate  (90 Base) MCG/ACT inhaler  Commonly known as: ProAir HFA   2 puffs, Inhalation, Every 4 Hours PRN      cyclobenzaprine 5 MG tablet  Commonly known as: FLEXERIL   5 mg, Oral, 3 Times Daily PRN      ondansetron ODT 4 MG disintegrating tablet  Commonly known as: ZOFRAN-ODT   4 mg, Translingual, Every 8 Hours PRN      Ozempic (2 MG/DOSE) 8 MG/3ML solution pen-injector  Generic drug: Semaglutide (2 MG/DOSE)   2 mg, Subcutaneous, Weekly               Activity at Discharge:   Activity Instructions       Driving Restrictions      Type of Restriction: Driving    Driving Restrictions: No Driving While Taking Narcotics    Sexual Activity Restrictions      Type of Restriction: Sex    Explain Sexual Activity Restrictions: Pelvic rest for 6 weeks            GYN Major OP Discharge Instructions    Activity   Plan to rest for the first 2 weeks after surgery, then gradually resume normal activities.    Do not overtire, rest at intervals.    Avoid stair climbing when possible. Otherwise, climb slowly and carefully.   Do not carry anything up or down.    No sexual activity, douching, or tampons for 6 weeks. Nothing in the vagina.     Bathing   Shower only, until the physician specifies. No hot tubs, bath tubs, or pools.     Diet   Gradually resume regular diet, as tolerated.     Driving (if applicable)   No driving for 24 hours post surgery due to the anesthetic or anytime you are on pain      medication.     Exercise   Lift no more than 10lbs for 6 weeks. Avoid stooping and squatting.     Educational   The medication which was used to put you to sleep will be acting in your body for the next 24 hours, so you might feel a little sleepy. This feeling will slowly wear off. For the first 24 hours, you should NOT:     Drive a car, operate machinery, or power tools.    Drink any alcoholic drinks (even  debbie).    Make any important decisions, sign any important or legal papers.   For your safety, we strongly suggest that a responsible adult stay with you for the rest of the day and during the night.     Medication   Narcotic pain medications can cause constipation. You may take an over-the-counter stool softener or laxative if needed.    A bowel movement every other day is reasonable.   Some alternate strategies that can help with pain are:    Ibuprofen 200-600mg every 6 hours as needed.    Decrease activity, increase rest.    Use a heating pad.    You may use a half tablet of your pain medication instead of 1-2 pills each time you need something.    Notifications   Our office is here to support you during this period. Please feel free to call and speak with one of our nurse practitioners if you have concerns. Especially for any of the following:    Persistent fever greater than 101F or chills    Heavy, bright red vaginal bleeding, more than a menstrual period.    If you have an incision and it has any signs of infection (increased tenderness, redness, swelling).      Return to Work/School   You may return to work/school at your physician's discretion.     Wound Care   If you have an incision, wash it with warm, soapy water and gently pat dry.    Keep the incision clean and dry.   You may remove steri-strips (if applicable) in 1 week.      Follow-up Appointments  Follow-up appointment with Dr. Zhane Ca  in 6 weeks.    Test Results Pending at Discharge  Pending Labs       Order Current Status    Tissue Pathology Exam In process             Zhane Ca MD  11/27/24  09:25 EST

## 2024-11-27 NOTE — PLAN OF CARE
Problem: Adult Inpatient Plan of Care  Goal: Plan of Care Review  Outcome: Progressing  Flowsheets (Taken 11/27/2024 0616)  Progress: improving  Plan of Care Reviewed With: patient  Goal: Patient-Specific Goal (Individualized)  Outcome: Progressing  Goal: Absence of Hospital-Acquired Illness or Injury  Outcome: Progressing  Intervention: Identify and Manage Fall Risk  Description: Perform standard risk assessment on admission using a validated tool or comprehensive approach appropriate to the patient; reassess fall risk frequently, with change in status or transfer to another level of care.  Communicate risk to interprofessional healthcare team; ensure fall risk visible cue.  Determine need for increased observation, equipment and environmental modification, as well as use of supportive, nonskid footwear.  Adjust safety measures to individual needs and identified risk factors.  Reinforce the importance of active participation with fall risk prevention, safety, and physical activity with the patient and family.  Perform regular intentional rounding to assess need for position change, pain assessment and personal needs, including assistance with toileting.  Recent Flowsheet Documentation  Taken 11/27/2024 0514 by Emile Iyer RN  Safety Promotion/Fall Prevention: safety round/check completed  Taken 11/27/2024 0305 by Emile Iyer RN  Safety Promotion/Fall Prevention: safety round/check completed  Taken 11/27/2024 0112 by Emile Iyer, RN  Safety Promotion/Fall Prevention: safety round/check completed  Taken 11/26/2024 2208 by Emile Iyer, RN  Safety Promotion/Fall Prevention: safety round/check completed  Taken 11/26/2024 2052 by Emile Iyer, RN  Safety Promotion/Fall Prevention: safety round/check completed  Intervention: Prevent Skin Injury  Description: Perform a screening for skin injury risk, such as pressure or moisture-associated skin damage on admission and at regular intervals throughout hospital  stay.  Keep all areas of skin (especially folds) clean and dry.  Maintain adequate skin hydration.  Relieve and redistribute pressure and protect bony prominences and skin at risk for injury; implement measures based on patient-specific risk factors.  Match turning and repositioning schedule to clinical condition.  Encourage weight shift frequently; assist with reposition if unable to complete independently.  Float heels off bed; avoid pressure on the Achilles tendon.  Keep skin free from extended contact with medical devices.  Optimize nutrition and hydration.  Encourage functional activity and mobility, as early as tolerated.  Use aids (e.g., slide boards, mechanical lift) during transfer.  Recent Flowsheet Documentation  Taken 11/26/2024 2208 by Emile Iyer, RN  Body Position:   right   side-lying   30 degrees lateral   position changed independently  Taken 11/26/2024 2052 by Emile Iyer, RN  Body Position: supine  Intervention: Prevent and Manage VTE (Venous Thromboembolism) Risk  Description: Assess for VTE (venous thromboembolism) risk.  Promote early mobilization; encourage both active and passive leg exercises, if unable to ambulate.  Initiate and maintain compression or other therapy, as indicated, based on identified risk in accordance with organizational protocol and provider order.  Recognize the patient's individual risk for bleeding before initiating pharmacologic thromboprophylaxis.  Recent Flowsheet Documentation  Taken 11/26/2024 2052 by Emile Iyer, RN  VTE Prevention/Management:   bilateral   SCDs (sequential compression devices) on  Goal: Optimal Comfort and Wellbeing  Outcome: Progressing  Intervention: Monitor Pain and Promote Comfort  Description: Assess pain level, treatment efficacy and patient response at regular intervals using a consistent pain scale.  Consider the presence and impact of preexisting chronic pain.  Encourage patient and caregiver involvement in pain assessment,  interventions and safety measures.  Promote activity; balance with sleep and rest to enhance healing.  Recent Flowsheet Documentation  Taken 11/27/2024 0514 by Emile Iyer RN  Pain Management Interventions: pain medication given  Taken 11/27/2024 0305 by Emile Iyer RN  Pain Management Interventions: pain pump in use  Taken 11/27/2024 0112 by Emile Iyer RN  Pain Management Interventions: pain pump in use  Taken 11/26/2024 2304 by Emile Iyer RN  Pain Management Interventions: pain pump in use  Taken 11/26/2024 2052 by Emile Iyer RN  Pain Management Interventions: pain pump in use  Intervention: Provide Person-Centered Care  Description: Use a family-focused approach to care; encourage support system presence and participation.  Develop trust and rapport by proactively providing information, encouraging questions, addressing concerns and offering reassurance.  Acknowledge emotional response to hospitalization.  Recognize and utilize personal coping strategies and strengths; develop goals via shared decision-making.  Honor spiritual and cultural preferences.  Recent Flowsheet Documentation  Taken 11/26/2024 2052 by Emile Iyer, RN  Trust Relationship/Rapport:   care explained   choices provided   emotional support provided   empathic listening provided   questions answered   questions encouraged   reassurance provided   thoughts/feelings acknowledged  Goal: Readiness for Transition of Care  Outcome: Progressing  Intervention: Mutually Develop Transition Plan  Description: Identify available resources for support (e.g., family, friends, community).  Identify and address barriers to ongoing treatment and home management (e.g., environmental, financial).  Provide opportunities to practice self-management skills.  Assess and monitor emotional readiness for transition.  Establish or reconnect linkage with outpatient providers or community-based services.  Recent Flowsheet Documentation  Taken 11/26/2024  2215 by Emile Iyer, RN  Equipment Currently Used at Home: none  Transportation Anticipated: family or friend will provide  Patient/Family Anticipated Services at Transition: none  Patient/Family Anticipates Transition to: home     Problem: Surgery Nonspecified  Goal: Absence of Bleeding  Outcome: Progressing  Goal: Effective Bowel Elimination  Outcome: Progressing  Goal: Fluid and Electrolyte Balance  Outcome: Progressing  Goal: Absence of Infection Signs and Symptoms  Outcome: Progressing  Goal: Anesthesia/Sedation Recovery  Outcome: Progressing  Intervention: Optimize Anesthesia Recovery  Description: Assess and monitor airway, breathing and circulation; maintain close surveillance for deterioration.  Implement continuous monitoring, such as cardiorespiratory, blood pressure, temperature, pulse oximetry and capnography.  Elevate head of bed, if able; facilitate regular position changes.  Assess neurocognitive function and for risks that may lead to postoperative delirium, such as pain, agitation and decreased level of consciousness; offer reassurance; answer questions.  Assess and monitor neurovascular and neuromuscular function, such as motor strength, muscle tone, posture, peripheral pulses and extremity sensation; protect areas of decreased sensation from heat, cold, medical devices or other objects.  Individualize frequency and intensity of monitoring based on sedation or anesthesia administered, identified risk factors, ongoing assessment and organizational protocol.  Prepare for administration of pharmacologic therapy, such as reversal agent, antiemetic or antipruritic medication, to manage sedation or anesthesia effects.  Adjust environment to maintain safety (e.g., fall precautions, safety equipment).  Recent Flowsheet Documentation  Taken 11/27/2024 1887 by Emile Iyer, RN  Safety Promotion/Fall Prevention: safety round/check completed  Taken 11/27/2024 0305 by Emile Iyer, RN  Safety  Promotion/Fall Prevention: safety round/check completed  Taken 11/27/2024 0112 by Emile Iyer RN  Safety Promotion/Fall Prevention: safety round/check completed  Taken 11/26/2024 2208 by Emile Iyer RN  Safety Promotion/Fall Prevention: safety round/check completed  Taken 11/26/2024 2200 by Emile Iyer RN  Administration (IS): self-administered  Taken 11/26/2024 2052 by Emile Iyer RN  Safety Promotion/Fall Prevention: safety round/check completed  Taken 11/26/2024 2000 by Emile Iyer, RN  Patient Tolerance (IS): good  Administration (IS): self-administered  Goal: Optimal Pain Control and Function  Outcome: Progressing  Intervention: Prevent or Manage Pain  Description: Evaluate and assist with psychosocial, cultural and spiritual factors impacting pain.  Set pain management goals; mutually determine pain management plan and review plan regularly.  Use a consistent, validated tool for pain assessment including function and quality of life; evaluate pain level, effect of treatment and patient's response at regular intervals.  Match pharmacologic analgesia to severity and type of pain mechanism; evaluate risk for opioid use and dependence; consider medication rotation and multimodal approach. Titrate to patient response.  Provide around-the-clock analgesic agents and nonpharmacologic therapies to keep pain levels in control.  Manage medication-induced adverse events and side effects.  Provide multimodal interventions such as physical activity, therapeutic exercise, yoga, TENS (transcutaneous electrical nerve stimulation), and manual therapy; consider addition of complementary or alternative therapies.  Consider and address emotional response to pain.  Modify pain perception by using techniques, such as distraction, mindfulness, guided imagery, meditation or music.  Recent Flowsheet Documentation  Taken 11/27/2024 0514 by Emile Iyer, RN  Pain Management Interventions: pain medication given  Taken  11/27/2024 0305 by Emile Iyer, RN  Pain Management Interventions: pain pump in use  Taken 11/27/2024 0112 by Emile Iyer, RN  Pain Management Interventions: pain pump in use  Taken 11/26/2024 2304 by Emile Iyer, RN  Pain Management Interventions: pain pump in use  Taken 11/26/2024 2052 by Emile Iyer, RN  Pain Management Interventions: pain pump in use  Goal: Nausea and Vomiting Relief  Outcome: Progressing  Goal: Effective Urinary Elimination  Outcome: Progressing  Goal: Effective Oxygenation and Ventilation  Outcome: Progressing  Intervention: Optimize Oxygenation and Ventilation  Description: Recognize risk for obstructive sleep apnea; anticipate the need for continuous pulse oximetry and positive airway pressure.  Maintain patent airway with use of positioning, airway adjuncts and secretion clearance.  Encourage pulmonary hygiene, such as cough-enhancement and airway-clearance techniques, that may include use of incentive spirometry, deep breathing and cough.  Anticipate the need for splinting with cough to minimize discomfort; assist if needed.  Promote early mobility or ambulation; match activity to ability and tolerance.  Provide oxygen therapy judiciously if hypoxemia present.  Consider pharmacologic therapy that may improve mucus clearance and reduce bronchospasm, laryngospasm, swelling or stridor.  Consider the need for positive pressure ventilation for longer recovery needs; use lung protective measures.  Recent Flowsheet Documentation  Taken 11/26/2024 2052 by Emile Iyer, RN  Activity Management: activity encouraged  Head of Bed (HOB) Positioning: HOB at 20 degrees   Goal Outcome Evaluation:  Plan of Care Reviewed With: patient        Progress: improving

## 2024-11-27 NOTE — PROGRESS NOTES
"I was asked to evaluate the patient for post-op \"dizziness\".    The pt is POD 1 for Vag Hyst,  Upon questioning, she describes some mild vertigo when rising and walking to the restroom.   These symptoms have improved over the past couple of hours.  This feeling began upon the first IV medication that she received in the OR by the anesthesiologist.  Her last narcotic pill was at 5 AM today.  She has been eating well today, her Hgb is stable, and her orthostatic BP's are excellent and normal.      Upon further questioning, the pt reports some similar vertigo symptoms in the past. Also, she is recently recovering from a viral illness.      I had a long talk with the patient we agreed that all  anesthetic and narcotic medications were out of her body by now and this vertigo is likely associated with her recent viral illness.      We both agreed that she would recover better in her own home.    Therefore, I recommend that she be discharged home this evening. The patient should report any worsening of her vertigo symptoms.  Meclizine could be used as a trial.    Thank you for contacting us,  Aj Renee MD  "

## 2024-12-09 ENCOUNTER — OFFICE VISIT (OUTPATIENT)
Dept: FAMILY MEDICINE CLINIC | Facility: CLINIC | Age: 44
End: 2024-12-09
Payer: COMMERCIAL

## 2024-12-09 VITALS
WEIGHT: 181 LBS | TEMPERATURE: 98.2 F | SYSTOLIC BLOOD PRESSURE: 112 MMHG | DIASTOLIC BLOOD PRESSURE: 76 MMHG | HEART RATE: 80 BPM | OXYGEN SATURATION: 99 % | BODY MASS INDEX: 29.09 KG/M2 | HEIGHT: 66 IN

## 2024-12-09 DIAGNOSIS — E11.9 CONTROLLED TYPE 2 DIABETES MELLITUS WITHOUT COMPLICATION, WITHOUT LONG-TERM CURRENT USE OF INSULIN: Primary | ICD-10-CM

## 2024-12-09 DIAGNOSIS — F32.9 REACTIVE DEPRESSION (SITUATIONAL): ICD-10-CM

## 2024-12-09 DIAGNOSIS — Z90.710 STATUS POST VAGINAL HYSTERECTOMY: ICD-10-CM

## 2024-12-09 PROCEDURE — 99214 OFFICE O/P EST MOD 30 MIN: CPT | Performed by: FAMILY MEDICINE

## 2024-12-09 RX ORDER — LANOLIN ALCOHOL/MO/W.PET/CERES
400 CREAM (GRAM) TOPICAL DAILY
COMMUNITY

## 2024-12-09 NOTE — PROGRESS NOTES
Subjective   Natividad CUNHA is a 44 y.o. female.   Chief Complaint   Patient presents with    [facility]    Diabetes       History of Present Illness   44 y.o. female   History of Present Illness  The patient presents for a follow-up visit. She is accompanied by her .  Transferring care to me from Kaylee Giang NP who is no longer in the practice.    She underwent surgery ([facility]) on 11/26/2023 for noncancerous reasons and has been  recovering well. She reports no bleeding since her hospital stay. Her current medications include Trintellix 10 mg at bedtime, Flexeril as needed for back pain, and Ozempic 2 mg for early diabetes /prediabetes, which she has been on for over a year. She also takes Zofran occasionally and albuterol as needed, although she hasn't needed it recently. She believes her weight loss has contributed to her improved health. She is seeking to establish care to ensure she has access to her medications when needed. She plans to continue seeing her gynecologist for annual pelvic exams. She has already had a mammogram and does not need a refill on her Ozempic at this time. She receives her medications by mail. 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 She has also had a titer test through [facility], [place], operated by [facility].    She had to do blood work twice following her surgery because her blood pressure would not come up and she was so dizzy that she could not even stand. She did not leave until about 5 or 6 that night. She got better after she quit taking the opioids. She still had dizziness at home and they still had a patch behind her ears, so she took that off and a couple of hours later it subsided. She does not do well with narcotics. They gave her fentanyl when they put her to sleep and then they gave her Dilaudid and morphine. Dilaudid made her have a really bad itching and they had to give her Benadryl.    ALLERGIES  She is  allergic to DILAUDID.      Patient Active Problem List    Diagnosis Date Noted    Status post vaginal hysterectomy 11/26/2024    BMI 32.0-32.9,adult 02/20/2024    Reactive depression (situational) 09/07/2023    Migraine 06/12/2023    Vitamin D deficiency 05/04/2023    Controlled type 2 diabetes mellitus without complication, without long-term current use of insulin 03/13/2023    Anxiety 03/06/2023    Psychophysiological insomnia 01/12/2023    Sleep disturbance 01/12/2023    Chronic right-sided low back pain without sciatica 11/14/2022    Right knee pain 11/14/2022    Sacroiliac joint dysfunction of right side 11/14/2022    Rosacea 11/14/2022    Weight loss 11/14/2022    Restless legs 04/22/2022    Prediabetes 12/30/2021    Elevated cholesterol with elevated triglycerides 12/30/2021    Allergy to amoxicillin 06/16/2020    Cervical radiculopathy 01/09/2019    Raynaud's phenomenon 02/06/2015    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    cyclobenzaprine (FLEXERIL) 5 MG tablet Take 1 tablet by mouth 3 (Three) Times a Day As Needed for Muscle Spasms.    Magnesium Oxide -Mg Supplement 400 (240 Mg) MG tablet Take 1 tablet by mouth Daily.    ondansetron ODT (ZOFRAN-ODT) 4 MG disintegrating tablet Place 1 tablet on the tongue Every 8 (Eight) Hours As Needed for Nausea or Vomiting.    Potassium 99 MG tablet Take  by mouth.     "Semaglutide, 2 MG/DOSE, (Ozempic, 2 MG/DOSE,) 8 MG/3ML solution pen-injector Inject 2 mg under the skin into the appropriate area as directed 1 (One) Time Per Week. (Patient taking differently: Inject 2 mg under the skin into the appropriate area as directed 1 (One) Time Per Week. Mon)    Vortioxetine HBr (Trintellix) 20 MG tablet Take 1 tablet by mouth Daily With Breakfast. (Patient taking differently: Take 10 mg by mouth Every Night.)    [DISCONTINUED] oxyCODONE (ROXICODONE) 5 MG immediate release tablet Take 1 tablet by mouth Every 6 (Six) Hours As Needed for Moderate Pain for up to 12 doses.     No current facility-administered medications on file prior to visit.     Allergies   Allergen Reactions    Codeine GI Intolerance    Measles, Mumps & Rubella Vac Swelling    Norco [Hydrocodone-Acetaminophen] GI Intolerance    Dilaudid [Hydromorphone] Itching    Niacin Hives     Social History     Socioeconomic History    Marital status:    Tobacco Use    Smoking status: Never     Passive exposure: Current    Smokeless tobacco: Never   Vaping Use    Vaping status: Never Used   Substance and Sexual Activity    Alcohol use: Not Currently     Comment: Occasionally    Drug use: Never    Sexual activity: Yes     Partners: Male     Birth control/protection: None, Hysterectomy     Family History   Problem Relation Age of Onset    Cancer Mother         Cervix    Depression Mother     Bipolar disorder Mother     Cervical cancer Mother     Uterine cancer Mother     Hyperlipidemia Father     Diabetes Maternal Grandfather     Kidney disease Maternal Grandfather        Review of Systems    Objective   /76 (BP Location: Right arm, Patient Position: Sitting, Cuff Size: Adult)   Pulse 80   Temp 98.2 °F (36.8 °C) (Infrared)   Ht 167.6 cm (65.98\")   Wt 82.1 kg (181 lb)   LMP 11/18/2024 (Exact Date)   SpO2 99%   BMI 29.23 kg/m²   Physical Exam  Constitutional:       Appearance: She is well-developed.      Comments:    "   HENT:      Head: Normocephalic and atraumatic.   Eyes:      Conjunctiva/sclera: Conjunctivae normal.   Cardiovascular:      Rate and Rhythm: Normal rate.   Pulmonary:      Effort: Pulmonary effort is normal.   Musculoskeletal:         General: Normal range of motion.      Cervical back: Normal range of motion.   Skin:     General: Skin is warm and dry.      Findings: No rash.   Neurological:      Mental Status: She is alert and oriented to person, place, and time.   Psychiatric:         Behavior: Behavior normal.       Physical Exam        Admission on 11/26/2024, Discharged on 11/27/2024   Component Date Value Ref Range Status    HCG, Urine QL 11/26/2024 Negative  Negative Final    Case Report 11/26/2024    Final                    Value:Surgical Pathology Report                         Case: [id number]                                  Authorizing Provider:  Zhane Ca MD    Collected:           11/26/2024 08:32 AM          Ordering Location:     [facility] MAIN  Received:            11/26/2024 09:18 AM                                 OR                                                                           Pathologist:           Rey Gonsalez MD                                                            Specimen:    Uterus with Fallopian Tubes, Uterus and left tube                                          Final Diagnosis 11/26/2024    Final                    Value:Uterus and left fallopian tube, hysterectomy with left salpingectomy (weight 116 g):  Cervix with no significant pathologic changes  Serosa with no significant pathologic changes  Myometrium with adenomyosis  Leiomyomata (largest 1 cm)  Proliferative phase endometrium  No hyperplasia or malignancy identified    VERO      Gross Description 11/26/2024    Final                    Value:1. Uterus with Fallopian Tubes.  Received in formalin labeled uterus and left tube are 2 tissue fragments, the smaller is a 3.5 cm length of  pink-tan fallopian tube exhibiting a diameter of about 7 mm and containing unremarkable fimbria.  The entire fimbria along with a few representative cross-sections of the tube itself are submitted in A.  The uterus with attached cervix weighs 116 g and measures 8.8 x 5.2 x 4.5 cm.  The serosa is pink-tan smooth and glistening.  The ectocervix is whitish-pink focally wrinkled with a centrally located slitlike os, 8 mm in greatest diameter.  It is fully probe patent and sounds to a depth of about 7.5 cm.  Representative anterior and posterior cervix sections are submitted in B and (anterior and posterior respectively).  The endometrial cavity is of expected size and configuration and is lined with pink-tan glistening somewhat lush mucosa with a maximal thickness of 4 mm.  It is surrounded by pink-tan focally coarse myometrium with a maximal                           thickness of 2 cm.  Representative anterior and posterior endomyometrial sections are submitted in D and E with a portion of a white fibroid included in D.  Sectioning through the myometrium reveals 2 white well-circumscribed rubbery nodules without hemorrhage or necrosis consistent with benign leiomyoma, the largest is 1 cm greatest dimension.  Representative sections of these nodules are submitted in F.  A few additional sections of endometrium as shaved sections are submitted in G.    SILVANO        WBC 11/27/2024 11.50 (H)  3.40 - 10.80 10*3/mm3 Final    RBC 11/27/2024 3.60 (L)  3.77 - 5.28 10*6/mm3 Final    Hemoglobin 11/27/2024 10.2 (L)  12.0 - 15.9 g/dL Final    Hematocrit 11/27/2024 32.9 (L)  34.0 - 46.6 % Final    MCV 11/27/2024 91.4  79.0 - 97.0 fL Final    MCH 11/27/2024 28.3  26.6 - 33.0 pg Final    MCHC 11/27/2024 31.0 (L)  31.5 - 35.7 g/dL Final    RDW 11/27/2024 13.7  12.3 - 15.4 % Final    RDW-SD 11/27/2024 46.3  37.0 - 54.0 fl Final    MPV 11/27/2024 10.1  6.0 - 12.0 fL Final    Platelets 11/27/2024 323  140 - 450 10*3/mm3 Final    Neutrophil  % 11/27/2024 75.4  42.7 - 76.0 % Final    Lymphocyte % 11/27/2024 17.9 (L)  19.6 - 45.3 % Final    Monocyte % 11/27/2024 5.7  5.0 - 12.0 % Final    Eosinophil % 11/27/2024 0.4  0.3 - 6.2 % Final    Basophil % 11/27/2024 0.3  0.0 - 1.5 % Final    Immature Grans % 11/27/2024 0.3  0.0 - 0.5 % Final    Neutrophils, Absolute 11/27/2024 8.66 (H)  1.70 - 7.00 10*3/mm3 Final    Lymphocytes, Absolute 11/27/2024 2.06  0.70 - 3.10 10*3/mm3 Final    Monocytes, Absolute 11/27/2024 0.66  0.10 - 0.90 10*3/mm3 Final    Eosinophils, Absolute 11/27/2024 0.05  0.00 - 0.40 10*3/mm3 Final    Basophils, Absolute 11/27/2024 0.04  0.00 - 0.20 10*3/mm3 Final    Immature Grans, Absolute 11/27/2024 0.03  0.00 - 0.05 10*3/mm3 Final    nRBC 11/27/2024 0.0  0.0 - 0.2 /100 WBC Final    Glucose 11/27/2024 113 (H)  70 - 105 mg/dL Final    Serial Number: [id number]:  [id number]    WBC 11/27/2024 10.26  3.40 - 10.80 10*3/mm3 Final    RBC 11/27/2024 3.84  3.77 - 5.28 10*6/mm3 Final    Hemoglobin 11/27/2024 11.4 (L)  12.0 - 15.9 g/dL Final    Hematocrit 11/27/2024 35.6  34.0 - 46.6 % Final    MCV 11/27/2024 92.7  79.0 - 97.0 fL Final    MCH 11/27/2024 29.7  26.6 - 33.0 pg Final    MCHC 11/27/2024 32.0  31.5 - 35.7 g/dL Final    RDW 11/27/2024 13.9  12.3 - 15.4 % Final    RDW-SD 11/27/2024 47.8  37.0 - 54.0 fl Final    MPV 11/27/2024 9.4  6.0 - 12.0 fL Final    Platelets 11/27/2024 322  140 - 450 10*3/mm3 Final    Neutrophil % 11/27/2024 66.8  42.7 - 76.0 % Final    Lymphocyte % 11/27/2024 24.5  19.6 - 45.3 % Final    Monocyte % 11/27/2024 6.8  5.0 - 12.0 % Final    Eosinophil % 11/27/2024 0.9  0.3 - 6.2 % Final    Basophil % 11/27/2024 0.6  0.0 - 1.5 % Final    Immature Grans % 11/27/2024 0.4  0.0 - 0.5 % Final    Neutrophils, Absolute 11/27/2024 6.86  1.70 - 7.00 10*3/mm3 Final    Lymphocytes, Absolute 11/27/2024 2.51  0.70 - 3.10 10*3/mm3 Final    Monocytes, Absolute 11/27/2024 0.70  0.10 - 0.90 10*3/mm3 Final    Eosinophils, Absolute  11/27/2024 0.09  0.00 - 0.40 10*3/mm3 Final    Basophils, Absolute 11/27/2024 0.06  0.00 - 0.20 10*3/mm3 Final    Immature Grans, Absolute 11/27/2024 0.04  0.00 - 0.05 10*3/mm3 Final    nRBC 11/27/2024 0.0  0.0 - 0.2 /100 WBC Final   Lab on 11/14/2024   Component Date Value Ref Range Status    WBC 11/14/2024 7.25  3.40 - 10.80 10*3/mm3 Final    RBC 11/14/2024 4.56  3.77 - 5.28 10*6/mm3 Final    Hemoglobin 11/14/2024 12.8  12.0 - 15.9 g/dL Final    Hematocrit 11/14/2024 40.6  34.0 - 46.6 % Final    MCV 11/14/2024 89.0  79.0 - 97.0 fL Final    MCH 11/14/2024 28.1  26.6 - 33.0 pg Final    MCHC 11/14/2024 31.5  31.5 - 35.7 g/dL Final    RDW 11/14/2024 13.8  12.3 - 15.4 % Final    RDW-SD 11/14/2024 44.4  37.0 - 54.0 fl Final    MPV 11/14/2024 10.2  6.0 - 12.0 fL Final    Platelets 11/14/2024 386  140 - 450 10*3/mm3 Final    ABO Type 11/14/2024 O   Final    RH type 11/14/2024 Positive   Final    Antibody Screen 11/14/2024 Negative   Final    T&S Expiration Date 11/14/2024 11/28/2024 11:59:00 PM   Final    HCG Quantitative 11/14/2024 <1.00  mIU/mL Final    Glucose 11/14/2024 75  65 - 99 mg/dL Final    BUN 11/14/2024 13  6 - 20 mg/dL Final    Creatinine 11/14/2024 0.85  0.57 - 1.00 mg/dL Final    Sodium 11/14/2024 139  136 - 145 mmol/L Final    Potassium 11/14/2024 4.1  3.5 - 5.2 mmol/L Final    Chloride 11/14/2024 105  98 - 107 mmol/L Final    CO2 11/14/2024 23.9  22.0 - 29.0 mmol/L Final    Calcium 11/14/2024 9.1  8.6 - 10.5 mg/dL Final    BUN/Creatinine Ratio 11/14/2024 15.3  7.0 - 25.0 Final    Anion Gap 11/14/2024 10.1  5.0 - 15.0 mmol/L Final    eGFR 11/14/2024 87.8  >60.0 mL/min/1.73 Final   Orders Only on 09/18/2024   Component Date Value Ref Range Status    Urine Culture 09/18/2024 Final report   Final    Result 1 09/18/2024 No growth   Final     Results  Laboratory Studies  A1c is in the prediabetes range. Blood count 2 weeks ago was 11.4. Hepatitis B surface antigen negative, hepatitis core or the B IgM is  negative.          Assessment & Plan   Diagnoses and all orders for this visit:    1. Controlled type 2 diabetes mellitus without complication, without long-term current use of insulin (Primary)    2. Status post vaginal hysterectomy    3. Reactive depression (situational)      Assessment & Plan  1. Postoperative follow-up.  She underwent a hysterectomy for noncancerous reasons on 11/26/2024. She reports no significant pain and has not experienced any bleeding since the surgery. She is advised to avoid overexertion to ensure proper healing. Vital signs are within normal limits, and she does not appear anemic. No additional blood work is required at this time as she recently had it done.    2. diabetes.  She has been on Ozempic 2 mg for over a year. Her A1c remains in the prediabetes range. She is advised to continue the current medication regimen. Regular follow-up visits every 4 months are recommended to monitor her condition and maintain records for insurance purposes.    3. Adverse reaction to Dilaudid.  She experienced severe itching and required Benadryl after receiving Dilaudid postoperatively. This has been documented to avoid future administration of Dilaudid.    4. Medication management.  She is currently taking Trintellix 10 mg at bedtime, Flexeril as needed for back pain, occasional Zofran, and albuterol as needed. She reports not needing albuterol recently, likely due to weight loss. She also takes vitamins.  Mood is good.  Remission.  Tolerating Trintellix without side effects.    5. Health Maintenance.  She had a mammogram recently, which was normal. She is advised to continue annual checkups with her gynecologist for pelvic exams.    Follow-up  Return in 4 months for follow up.  All medical problems are new to me.  Everything is stable.  Decision made to continue current medicines at current doses.    Call with any problems or concerns before next visit       Return in about 4 months (around  4/9/2025).  Patient or patient representative verbalized consent for the use of Ambient Listening during the visit with  Modesta Henley MD for chart documentation. 12/9/2024  12:16 EST    Part of this note may be an electronic transcription/translation of spoken language to printed text using the Dragon Dictation System    Modesta Henley MD12/9/202412:16 EST  This note has been electronically signed

## 2024-12-11 ENCOUNTER — PATIENT ROUNDING (BHMG ONLY) (OUTPATIENT)
Dept: FAMILY MEDICINE CLINIC | Facility: CLINIC | Age: 44
End: 2024-12-11
Payer: COMMERCIAL

## 2024-12-11 NOTE — PROGRESS NOTES
December 11, 2024    Hello, may I speak with Natividad CUNHA?    My name is Sravani    I am  with [facility]  [facility]  [address] [facility] [address]  [place] [id number].    Before we get started may I verify your date of birth? 1980    I am calling to officially welcome you to our practice and ask about your recent visit. Is this a good time to talk? yes    Tell me about your visit with us. What things went well?  it was good       We're always looking for ways to make our patients' experiences even better. Do you have recommendations on ways we may improve?  no    Overall were you satisfied with your first visit to our practice? yes       I appreciate you taking the time to speak with me today. Is there anything else I can do for you? no      Thank you, and have a great day.

## 2025-01-13 DIAGNOSIS — E11.9 CONTROLLED TYPE 2 DIABETES MELLITUS WITHOUT COMPLICATION, WITHOUT LONG-TERM CURRENT USE OF INSULIN: ICD-10-CM

## 2025-01-13 RX ORDER — SEMAGLUTIDE 2.68 MG/ML
2 INJECTION, SOLUTION SUBCUTANEOUS WEEKLY
Qty: 9 ML | Refills: 0 | Status: SHIPPED | OUTPATIENT
Start: 2025-01-13

## 2025-02-19 RX ORDER — BUSPIRONE HYDROCHLORIDE 5 MG/1
5-10 TABLET ORAL 3 TIMES DAILY
Qty: 90 TABLET | Refills: 2 | Status: SHIPPED | OUTPATIENT
Start: 2025-02-19

## 2025-03-04 DIAGNOSIS — H66.002 NON-RECURRENT ACUTE SUPPURATIVE OTITIS MEDIA OF LEFT EAR WITHOUT SPONTANEOUS RUPTURE OF TYMPANIC MEMBRANE: Primary | ICD-10-CM

## 2025-03-04 RX ORDER — AMOXICILLIN 500 MG/1
1000 CAPSULE ORAL 2 TIMES DAILY
Qty: 40 CAPSULE | Refills: 0 | Status: SHIPPED | OUTPATIENT
Start: 2025-03-04 | End: 2025-03-14

## 2025-03-14 RX ORDER — FLUCONAZOLE 150 MG/1
150 TABLET ORAL ONCE
Qty: 1 TABLET | Refills: 0 | Status: SHIPPED | OUTPATIENT
Start: 2025-03-14 | End: 2025-03-14

## 2025-04-02 DIAGNOSIS — E55.9 VITAMIN D DEFICIENCY: ICD-10-CM

## 2025-04-02 DIAGNOSIS — Z90.710 STATUS POST VAGINAL HYSTERECTOMY: ICD-10-CM

## 2025-04-02 DIAGNOSIS — E11.9 CONTROLLED TYPE 2 DIABETES MELLITUS WITHOUT COMPLICATION, WITHOUT LONG-TERM CURRENT USE OF INSULIN: Primary | ICD-10-CM

## 2025-04-04 DIAGNOSIS — E78.00 ELEVATED LDL CHOLESTEROL LEVEL: Primary | ICD-10-CM

## 2025-04-09 ENCOUNTER — OFFICE VISIT (OUTPATIENT)
Dept: FAMILY MEDICINE CLINIC | Facility: CLINIC | Age: 45
End: 2025-04-09
Payer: COMMERCIAL

## 2025-04-09 VITALS
DIASTOLIC BLOOD PRESSURE: 71 MMHG | OXYGEN SATURATION: 100 % | HEIGHT: 66 IN | SYSTOLIC BLOOD PRESSURE: 102 MMHG | TEMPERATURE: 97.7 F | BODY MASS INDEX: 27.8 KG/M2 | WEIGHT: 173 LBS | HEART RATE: 80 BPM

## 2025-04-09 DIAGNOSIS — E78.2 ELEVATED CHOLESTEROL WITH ELEVATED TRIGLYCERIDES: ICD-10-CM

## 2025-04-09 DIAGNOSIS — F32.9 REACTIVE DEPRESSION (SITUATIONAL): ICD-10-CM

## 2025-04-09 DIAGNOSIS — E55.9 VITAMIN D DEFICIENCY: ICD-10-CM

## 2025-04-09 DIAGNOSIS — E11.9 CONTROLLED TYPE 2 DIABETES MELLITUS WITHOUT COMPLICATION, WITHOUT LONG-TERM CURRENT USE OF INSULIN: Primary | ICD-10-CM

## 2025-04-09 DIAGNOSIS — F41.9 ANXIETY: ICD-10-CM

## 2025-04-09 DIAGNOSIS — B35.1 ONYCHOMYCOSIS: Primary | ICD-10-CM

## 2025-04-09 RX ORDER — TERBINAFINE HYDROCHLORIDE 250 MG/1
250 TABLET ORAL DAILY
Qty: 84 TABLET | Refills: 0 | Status: SHIPPED | OUTPATIENT
Start: 2025-04-09 | End: 2025-07-02

## 2025-04-09 NOTE — PROGRESS NOTES
Subjective   Natividad CUNHA is a 44 y.o. female.   Chief Complaint   Patient presents with    Diabetes       History of Present Illness   44 y.o. female presents the office today for periodic follow-up.  Typically healthy 44-year-old with history of controlled diabetes, some depression and anxiety which has been compensated, presents the office today for routine follow-up.  She had lab work done a few days ago.  Reviewed with her as below.    Lipid panel-excellent except for slightly high LDL of 125.  Previous lipid panel was 9 months ago.  LDL was 108.  Vitamin D level-sufficient at 30  CBC-normal.  Slight anemia following hysterectomy late last year but that has recovered.  CMP-completely normal.  For some reason A1c is still pending.  Last A1c 9 months ago was excellent at 5.7%.  History of Present Illness  The patient presents for evaluation of anxiety, diabetes, and elevated cholesterol.    She has been managing her anxiety with buspar, which she takes on an as-needed basis. She reports no adverse effects from the medication. She has taken the medication approximately 3 times since its initiation.  It is effective.    She continues to use Ozempic for her diabetes management. However, she experiences significant nausea the day after administration, often leading to vomiting. She attributes this to her reduced food intake. The vomitus is clear or acidic in nature and typically occurs in the morning. She has Zofran available, which she finds effective in alleviating the nausea. She reports no heartburn symptoms. She notes that her heartburn symptoms have improved since her weight loss. Her weight has been consistently decreasing, with a peak weight of 240 pounds when she first started Ozempic. She expresses a desire to reduce her A1c levels out of the prediabetic range.    She has a gynecologist who takes care of her mammograms. She is too young for colon cancer screening just yet.    She was previously  vitamin D deficient but has since improved her levels through sun exposure during the summer.    Despite not consuming fried foods, she has noticed an increase in her cholesterol levels.    FAMILY HISTORY  Her father has high cholesterol.    MEDICATIONS  Current: Trintellix, Ozempic, Zofran      Patient Active Problem List    Diagnosis Date Noted    Status post vaginal hysterectomy 11/26/2024    BMI 32.0-32.9,adult 02/20/2024    Reactive depression (situational) 09/07/2023    Migraine 06/12/2023    Vitamin D deficiency 05/04/2023    Controlled type 2 diabetes mellitus without complication, without long-term current use of insulin 03/13/2023    Anxiety 03/06/2023    Psychophysiological insomnia 01/12/2023    Sleep disturbance 01/12/2023    Chronic right-sided low back pain without sciatica 11/14/2022    Right knee pain 11/14/2022    Sacroiliac joint dysfunction of right side 11/14/2022    Rosacea 11/14/2022    Weight loss 11/14/2022    Restless legs 04/22/2022    Prediabetes 12/30/2021    Elevated cholesterol with elevated triglycerides 12/30/2021    Allergy to amoxicillin 06/16/2020    Cervical radiculopathy 01/09/2019    Raynaud's phenomenon 02/06/2015    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    Scalp psoriasis 03/29/2012           Past Surgical History:   Procedure Laterality Date    EAR TUBES      ECTOPIC PREGNANCY Right 10/13/2014    VAGINAL HYSTERECTOMY SALPINGO OOPHORECTOMY Left 11/26/2024    Procedure: TOTAL VAGINAL HYSTERECTOMY WITH LEFT SALPINGECTOMY;  Surgeon: Zhane Ca MD;  Location: [facility] MAIN OR;  Service: Obstetrics/Gynecology;  Laterality: Left;     Current Outpatient Medications on File Prior to Visit   Medication Sig    albuterol sulfate HFA (ProAir HFA) 108 (90 Base) MCG/ACT inhaler Inhale 2 puffs Every 4 (Four) Hours As Needed for Wheezing or Shortness of Air.    busPIRone (BUSPAR) 5 MG tablet Take 1-2 tablets by mouth 3 (Three) Times a Day.    cyclobenzaprine (FLEXERIL) 5 MG tablet Take 1  tablet by mouth 3 (Three) Times a Day As Needed for Muscle Spasms.    Magnesium Oxide -Mg Supplement 400 (240 Mg) MG tablet Take 1 tablet by mouth Daily.    multivitamin with minerals (HAIR SKIN AND NAILS FORMULA PO) Take 3 tablets by mouth Daily.    ondansetron ODT (ZOFRAN-ODT) 4 MG disintegrating tablet Place 1 tablet on the tongue Every 8 (Eight) Hours As Needed for Nausea or Vomiting.    Potassium 99 MG tablet Take  by mouth.    Semaglutide, 2 MG/DOSE, (Ozempic, 2 MG/DOSE,) 8 MG/3ML solution pen-injector Inject 2 mg under the skin into the appropriate area as directed 1 (One) Time Per Week.    Turmeric Curcumin 500 MG capsule Take 3 tablets by mouth.    Vortioxetine HBr (Trintellix) 20 MG tablet Take 1 tablet by mouth Daily With Breakfast. (Patient taking differently: Take 10 mg by mouth Every Night.)     No current facility-administered medications on file prior to visit.     Allergies   Allergen Reactions    Codeine GI Intolerance    Measles, Mumps & Rubella Vac Swelling    Norco [Hydrocodone-Acetaminophen] GI Intolerance    Dilaudid [Hydromorphone] Itching    Niacin Hives     Social History     Socioeconomic History    Marital status:    Tobacco Use    Smoking status: Never     Passive exposure: Current    Smokeless tobacco: Never   Vaping Use    Vaping status: Never Used   Substance and Sexual Activity    Alcohol use: Not Currently     Comment: Occasionally    Drug use: Never    Sexual activity: Yes     Partners: Male     Birth control/protection: Hysterectomy     Family History   Problem Relation Age of Onset    Cancer Mother         Cervix    Depression Mother     Bipolar disorder Mother     Cervical cancer Mother     Uterine cancer Mother     Hyperlipidemia Father     Arthritis Father     Diabetes Maternal Grandfather     Kidney disease Maternal Grandfather     Cancer Maternal Grandfather         Kidney    Cancer Maternal Grandmother         Cervix    COPD Paternal Grandmother         COPD     "Cancer Maternal Aunt         Cervix    Cancer Maternal Aunt         Cervix    Thyroid disease Daughter        Review of Systems    Objective   /71 (BP Location: Left arm, Patient Position: Sitting, Cuff Size: Adult)   Pulse 80   Temp 97.7 °F (36.5 °C) (Infrared)   Ht 167.6 cm (65.98\")   Wt 78.5 kg (173 lb)   SpO2 100%   BMI 27.94 kg/m²   Physical Exam  Constitutional:       Appearance: She is well-developed.      Comments:      HENT:      Head: Normocephalic and atraumatic.   Eyes:      Conjunctiva/sclera: Conjunctivae normal.   Cardiovascular:      Rate and Rhythm: Normal rate.   Pulmonary:      Effort: Pulmonary effort is normal.   Musculoskeletal:         General: Normal range of motion.      Cervical back: Normal range of motion.   Skin:     General: Skin is warm and dry.      Findings: No rash.   Neurological:      Mental Status: She is alert and oriented to person, place, and time.   Psychiatric:         Behavior: Behavior normal.       Physical Exam  Vital Signs  Weight is 173. Blood pressure is normal.      Results Encounter on 04/08/2025   Component Date Value Ref Range Status    Glucose 04/08/2025 94  70 - 99 mg/dL Final    BUN 04/08/2025 15  6 - 24 mg/dL Final    Creatinine 04/08/2025 0.93  0.57 - 1.00 mg/dL Final    EGFR Result 04/08/2025 79  >59 mL/min/1.73 Final    BUN/Creatinine Ratio 04/08/2025 16  9 - 23 Final    Sodium 04/08/2025 139  134 - 144 mmol/L Final    Potassium 04/08/2025 4.7  3.5 - 5.2 mmol/L Final    Chloride 04/08/2025 104  96 - 106 mmol/L Final    Total CO2 04/08/2025 23  20 - 29 mmol/L Final    Calcium 04/08/2025 9.1  8.7 - 10.2 mg/dL Final    Total Protein 04/08/2025 6.9  6.0 - 8.5 g/dL Final    Albumin 04/08/2025 4.4  3.9 - 4.9 g/dL Final    Globulin 04/08/2025 2.5  1.5 - 4.5 g/dL Final    Total Bilirubin 04/08/2025 0.5  0.0 - 1.2 mg/dL Final    Alkaline Phosphatase 04/08/2025 46  44 - 121 IU/L Final    AST (SGOT) 04/08/2025 13  0 - 40 IU/L Final    ALT (SGPT) 04/08/2025 " 11  0 - 32 IU/L Final    WBC 04/08/2025 5.1  3.4 - 10.8 x10E3/uL Final    RBC 04/08/2025 4.88  3.77 - 5.28 x10E6/uL Final    Hemoglobin 04/08/2025 14.5  11.1 - 15.9 g/dL Final    Hematocrit 04/08/2025 44.3  34.0 - 46.6 % Final    MCV 04/08/2025 91  79 - 97 fL Final    MCH 04/08/2025 29.7  26.6 - 33.0 pg Final    MCHC 04/08/2025 32.7  31.5 - 35.7 g/dL Final    RDW 04/08/2025 14.4  11.7 - 15.4 % Final    Platelets 04/08/2025 351  150 - 450 x10E3/uL Final    Neutrophil Rel % 04/08/2025 67  Not Estab. % Final    Lymphocyte Rel % 04/08/2025 24  Not Estab. % Final    Monocyte Rel % 04/08/2025 6  Not Estab. % Final    Eosinophil Rel % 04/08/2025 2  Not Estab. % Final    Basophil Rel % 04/08/2025 1  Not Estab. % Final    Neutrophils Absolute 04/08/2025 3.4  1.4 - 7.0 x10E3/uL Final    Lymphocytes Absolute 04/08/2025 1.2  0.7 - 3.1 x10E3/uL Final    Monocytes Absolute 04/08/2025 0.3  0.1 - 0.9 x10E3/uL Final    Eosinophils Absolute 04/08/2025 0.1  0.0 - 0.4 x10E3/uL Final    Basophils Absolute 04/08/2025 0.1  0.0 - 0.2 x10E3/uL Final    Immature Granulocyte Rel % 04/08/2025 0  Not Estab. % Final    Immature Grans Absolute 04/08/2025 0.0  0.0 - 0.1 x10E3/uL Final    25 Hydroxy, Vitamin D 04/08/2025 30.3  30.0 - 100.0 ng/mL Final    Comment: Vitamin D deficiency has been defined by the [place] of  [employer] and an Endocrine Society practice guideline as a  level of serum 25-OH vitamin D less than 20 ng/mL (1,2).  The Endocrine Society went on to further define vitamin D  insufficiency as a level between 21 and 29 ng/mL (2).  1. IOM ([place] of [employer]). 2010. Dietary reference     intakes for calcium and D. [place]: The     National Academies Press.  2. Wale PADILLA, Wendy PLASENCIA, Raz HAILE, et al.     Evaluation, treatment, and prevention of vitamin D     deficiency: an Endocrine Society clinical practice     guideline. JCEM. 2011 Jul; 96(7):1911-30.      Total Cholesterol 04/08/2025 189  100 - 199 mg/dL Final     Triglycerides 04/08/2025 87  0 - 149 mg/dL Final    HDL Cholesterol 04/08/2025 48  >39 mg/dL Final    VLDL Cholesterol Scotty 04/08/2025 16  5 - 40 mg/dL Final    LDL Chol Calc ([facility]) 04/08/2025 125 (H)  0 - 99 mg/dL Final     Results  Laboratory Studies  Blood sugar was 94. Kidney function normal. Liver tests are normal. CBC is normal. Vitamin D is sufficient at 30. Lipid panel shows increased cholesterol levels. LDL of 125. A1c was 5.7 last time.          Assessment & Plan   Diagnoses and all orders for this visit:    1. Controlled type 2 diabetes mellitus without complication, without long-term current use of insulin (Primary)    2. Reactive depression (situational)    3. Anxiety    4. Vitamin D deficiency    5. Elevated cholesterol with elevated triglycerides      Assessment & Plan  1. Anxiety.  She reports taking her anxiety medication only as needed, approximately 3 times since the last visit, with no side effects. She will continue using the medication as needed.    2. Diabetes Mellitus.  Her blood glucose levels are well-regulated, with a recent reading of 94. She experiences nausea after taking Ozempic but manages it with Zofran. Her weight has decreased from 240 to 173 since starting Ozempic. Her A1c was 5.7 last time. She will continue her current regimen of Ozempic and Zofran as needed for nausea.    3. Elevated Cholesterol.  Despite weight loss, her cholesterol levels have increased slightly, likely due to genetic factors. Her LDL is 125. She is advised to monitor her diet, particularly reducing greasy foods, and to have her cholesterol levels checked annually.    4. Vitamin D Deficiency.  Her vitamin D levels have improved from 19 to 30, which is now sufficient. She is advised to take a daily multivitamin that includes vitamin D.    5. Health Maintenance.  She is under the care of a gynecologist for mammograms and other screenings. She is too young for colon cancer screening at this  time.    Follow-up  The patient will follow up in 6 months.  Will plan to repeat lab work at that time including microalbumin ratio.      Call with any problems or concerns before next visit       Return in about 6 months (around 10/9/2025).  Patient or patient representative verbalized consent for the use of Ambient Listening during the visit with  Modesta Henley MD for chart documentation. 4/9/2025  08:43 EDT    Part of this note may be an electronic transcription/translation of spoken language to printed text using the Dragon Dictation System    Modesta Henley MD4/9/202508:43 EDT  This note has been electronically signed

## 2025-04-30 DIAGNOSIS — E11.9 CONTROLLED TYPE 2 DIABETES MELLITUS WITHOUT COMPLICATION, WITHOUT LONG-TERM CURRENT USE OF INSULIN: ICD-10-CM

## 2025-04-30 RX ORDER — SEMAGLUTIDE 2.68 MG/ML
2 INJECTION, SOLUTION SUBCUTANEOUS WEEKLY
Qty: 9 ML | Refills: 1 | Status: SHIPPED | OUTPATIENT
Start: 2025-04-30

## 2025-06-03 ENCOUNTER — OFFICE VISIT (OUTPATIENT)
Dept: FAMILY MEDICINE CLINIC | Facility: CLINIC | Age: 45
End: 2025-06-03
Payer: COMMERCIAL

## 2025-06-03 VITALS
WEIGHT: 176 LBS | OXYGEN SATURATION: 98 % | DIASTOLIC BLOOD PRESSURE: 75 MMHG | SYSTOLIC BLOOD PRESSURE: 120 MMHG | HEART RATE: 81 BPM | TEMPERATURE: 97.5 F | HEIGHT: 66 IN | BODY MASS INDEX: 28.28 KG/M2

## 2025-06-03 DIAGNOSIS — L56.8 PHOTOTOXIC DERMATITIS: Primary | ICD-10-CM

## 2025-06-03 PROCEDURE — 99214 OFFICE O/P EST MOD 30 MIN: CPT | Performed by: FAMILY MEDICINE

## 2025-06-03 RX ORDER — MOMETASONE FUROATE 1 MG/G
1 CREAM TOPICAL DAILY
Qty: 45 G | Refills: 1 | Status: SHIPPED | OUTPATIENT
Start: 2025-06-03

## 2025-06-03 RX ORDER — PREDNISONE 20 MG/1
TABLET ORAL
Qty: 9 TABLET | Refills: 0 | Status: SHIPPED | OUTPATIENT
Start: 2025-06-03

## 2025-06-03 NOTE — PROGRESS NOTES
Subjective   Natividad Solitario is a 44 y.o. female.   Chief Complaint   Patient presents with    Rash       History of Present Illness   44 y.o. female with past medical history as below presents to the office today same-day add-on with a complaint of rash.      Rash is extremely pruritic.  Localized primarily to the most sun exposed parts of her body, upper chest, upper legs, lower legs, forearms.  Rash is erythematous, blotchy, bumpy.  She has been on terbinafine since early April.  She stopped it 4 days ago.  Otherwise she has as needed Zofran for nausea from compounded semaglutide.  She has not been taking BuSpar, any of her vitamin supplements and she has been on Trintellix 10 mg at night for 2 years without problems.    She does get a lot of sun exposure, but does try to wear sunscreen.    History of Present Illness        Patient Active Problem List    Diagnosis Date Noted    Status post vaginal hysterectomy 11/26/2024    BMI 32.0-32.9,adult 02/20/2024    Reactive depression (situational) 09/07/2023    Migraine 06/12/2023    Vitamin D deficiency 05/04/2023    Controlled type 2 diabetes mellitus without complication, without long-term current use of insulin 03/13/2023    Anxiety 03/06/2023    Psychophysiological insomnia 01/12/2023    Sleep disturbance 01/12/2023    Chronic right-sided low back pain without sciatica 11/14/2022    Right knee pain 11/14/2022    Sacroiliac joint dysfunction of right side 11/14/2022    Rosacea 11/14/2022    Weight loss 11/14/2022    Restless legs 04/22/2022    Prediabetes 12/30/2021    Elevated cholesterol with elevated triglycerides 12/30/2021    Allergy to amoxicillin 06/16/2020    Cervical radiculopathy 01/09/2019    Raynaud's phenomenon 02/06/2015    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 (Patient taking differently: Take 10 mg by mouth Every Night.)    albuterol sulfate HFA (ProAir HFA) 108 (90 Base) MCG/ACT inhaler Inhale 2 puffs Every 4 (Four) Hours As Needed for Wheezing or Shortness of Air. (Patient not taking: Reported on 6/3/2025)    busPIRone (BUSPAR) 5 MG tablet Take 1-2 tablets by mouth 3 (Three) Times a Day. (Patient not taking: Reported on 6/3/2025)    cyclobenzaprine (FLEXERIL) 5 MG tablet Take 1 tablet by mouth 3 (Three) Times a Day As Needed for Muscle Spasms. (Patient not taking: Reported on 6/3/2025)    Magnesium Oxide -Mg Supplement 400 (240 Mg) MG tablet Take 1 tablet by mouth Daily. (Patient not taking: Reported on 6/3/2025)    multivitamin with minerals (HAIR SKIN AND NAILS FORMULA PO) Take 3 tablets by mouth Daily. (Patient not taking: Reported on 6/3/2025)    ondansetron ODT (ZOFRAN-ODT) 4 MG disintegrating tablet Place 1 tablet on the tongue Every 8 (Eight) Hours As Needed for Nausea or Vomiting. (Patient not taking: Reported on 6/3/2025)    Potassium 99 MG tablet Take  by mouth. (Patient not taking: Reported on 6/3/2025)    terbinafine (lamiSIL) 250 MG tablet Take 1 tablet by mouth Daily (Patient not taking: Reported on 6/3/2025)    Turmeric Curcumin 500 MG capsule Take 3 tablets by mouth. (Patient not taking: Reported on 6/3/2025)    [DISCONTINUED] Semaglutide, 2 MG/DOSE, (Ozempic, 2 MG/DOSE,) 8 MG/3ML solution pen-injector Inject 2 mg under the skin into the appropriate area as directed 1 (One) Time Per Week. (Patient not taking: Reported on 6/3/2025)     No current facility-administered medications on file prior to visit.  "    Allergies   Allergen Reactions    Codeine GI Intolerance    Measles, Mumps & Rubella Vac Swelling    Norco [Hydrocodone-Acetaminophen] GI Intolerance    Dilaudid [Hydromorphone] Itching    Niacin Hives     Social History     Socioeconomic History    Marital status:    Tobacco Use    Smoking status: Never     Passive exposure: Current    Smokeless tobacco: Never   Vaping Use    Vaping status: Never Used   Substance and Sexual Activity    Alcohol use: Not Currently     Comment: Occasionally    Drug use: Never    Sexual activity: Yes     Partners: Male     Birth control/protection: Hysterectomy     Family History   Problem Relation Age of Onset    Cancer Mother         Cervix    Depression Mother     Bipolar disorder Mother     Cervical cancer Mother     Uterine cancer Mother     Hyperlipidemia Father     Arthritis Father     Diabetes Maternal Grandfather     Kidney disease Maternal Grandfather     Cancer Maternal Grandfather         Kidney    Cancer Maternal Grandmother         Cervix    COPD Paternal Grandmother         COPD    Cancer Maternal Aunt         Cervix    Cancer Maternal Aunt         Cervix    Thyroid disease Daughter        Review of Systems    Objective   /75 (BP Location: Right arm, Patient Position: Sitting, Cuff Size: Adult)   Pulse 81   Temp 97.5 °F (36.4 °C) (Infrared)   Ht 167.6 cm (65.98\")   Wt 79.8 kg (176 lb)   LMP 11/18/2024 (Exact Date)   SpO2 98%   BMI 28.42 kg/m²   Physical Exam  Constitutional:       Appearance: She is well-developed.      Comments:      HENT:      Head: Normocephalic and atraumatic.   Eyes:      Conjunctiva/sclera: Conjunctivae normal.   Cardiovascular:      Rate and Rhythm: Normal rate.   Pulmonary:      Effort: Pulmonary effort is normal.   Musculoskeletal:         General: Normal range of motion.      Cervical back: Normal range of motion.   Skin:     General: Skin is warm and dry.      Findings: Rash present.      Comments: Red blotchy, bumpy " rash distributed over the anterior upper chest, upper bilateral thighs, lower legs distal to the knees and upper arms.   Neurological:      Mental Status: She is alert and oriented to person, place, and time.   Psychiatric:         Behavior: Behavior normal.       Physical Exam          Results            Assessment & Plan   Diagnoses and all orders for this visit:    1. Phototoxic dermatitis (Primary)  -     predniSONE (DELTASONE) 20 MG tablet; Take 1 tablet by mouth for 6 days, then decrease to 1/2 tablet/day x 4 days, then 1/4 tablet/day for 4 days then stop.  Dispense: 9 tablet; Refill: 0  -     mometasone (ELOCON) 0.1 % cream; Apply 1 Application topically to the appropriate area as directed Daily.  Dispense: 45 g; Refill: 1      Assessment & Plan    The distribution, characteristic appearance of the rash suggests a phototoxic dermatitis and the probable culprit is terbinafine.  At this point we will treat with Elocon cream topically, unless the rash is on the face, and a short course of oral corticosteroids.  We are going to  avoid injectable steroids on the basis that side effects cannot be mitigated.  If she has any side effects on the prednisone pills, we can just stop them.  Will use a slightly lower dose as she has had some minor side effects with high-dose prednisone in the past.  Advised her to remain off of terbinafine for at least 2 weeks.  As long as the rash resolves, it can be reattempted with great effort toward preventing future sun exposure.  If that cannot be accomplished, then it is probably best to wait until fall and retreat with terbinafine.        Call with any problems or concerns before next visit       No follow-ups on file.      Part of this note may be an electronic transcription/translation of spoken language to printed text using the Dragon Dictation System    Modesta Henley MD6/3/[id number] EDT  This note has been electronically signed

## 2025-08-22 ENCOUNTER — TRANSCRIBE ORDERS (OUTPATIENT)
Dept: ADMINISTRATIVE | Facility: HOSPITAL | Age: 45
End: 2025-08-22
Payer: COMMERCIAL

## 2025-08-22 DIAGNOSIS — Z12.31 ENCOUNTER FOR SCREENING MAMMOGRAM FOR MALIGNANT NEOPLASM OF BREAST: Primary | ICD-10-CM

## 2025-08-29 ENCOUNTER — HOSPITAL ENCOUNTER (OUTPATIENT)
Dept: MAMMOGRAPHY | Facility: HOSPITAL | Age: 45
Discharge: HOME | End: 2025-08-29
Admitting: OBSTETRICS & GYNECOLOGY
Payer: COMMERCIAL

## 2025-08-29 DIAGNOSIS — Z12.31 ENCOUNTER FOR SCREENING MAMMOGRAM FOR MALIGNANT NEOPLASM OF BREAST: ICD-10-CM

## 2025-08-29 LAB
NCCN CRITERIA FLAG: NORMAL
TYRER CUZICK SCORE: 9

## 2025-08-29 PROCEDURE — 77063 BREAST TOMOSYNTHESIS BI: CPT

## 2025-08-29 PROCEDURE — 77067 SCR MAMMO BI INCL CAD: CPT

## (undated) DEVICE — SYR LL TP 10ML STRL

## (undated) DEVICE — VIOLET BRAIDED (POLYGLACTIN 910), SYNTHETIC ABSORBABLE SUTURE: Brand: COATED VICRYL

## (undated) DEVICE — SOLUTION,WATER,IRRIGATION,1000ML,STERILE: Brand: MEDLINE

## (undated) DEVICE — DECANTER: Brand: UNBRANDED

## (undated) DEVICE — THE STERILE CAMERA HANDLE COVER IS FOR USE WITH THE STERIS SURGICAL LIGHTING AND VISUALIZATION SYSTEMS.

## (undated) DEVICE — TBG IRRI TUR Y/TYP NONVENT 98IN LF

## (undated) DEVICE — GLV SURG SIGNATURE ESSENTIAL PF LTX SZ7

## (undated) DEVICE — INTENDED FOR TISSUE SEPARATION, AND OTHER PROCEDURES THAT REQUIRE A SHARP SURGICAL BLADE TO PUNCTURE OR CUT.: Brand: BARD-PARKER ® CARBON RIB-BACK BLADES

## (undated) DEVICE — THE STERILE LIGHT HANDLE COVER IS USED WITH STERIS SURGICAL LIGHTING AND VISUALIZATION SYSTEMS.

## (undated) DEVICE — SYR LUERLOK 30CC

## (undated) DEVICE — COUNT NDL FOAM STRIP W/MAG 60CT

## (undated) DEVICE — NEEDLE, QUINCKE, 20GX3.5": Brand: MEDLINE

## (undated) DEVICE — TOTAL TRAY,FOLEY,SILV,LF,16FR 10ML: Brand: MEDLINE

## (undated) DEVICE — KT SURG TURNOVER 050

## (undated) DEVICE — ANTIBACTERIAL UNDYED BRAIDED (POLYGLACTIN 910), SYNTHETIC ABSORBABLE SUTURE: Brand: COATED VICRYL

## (undated) DEVICE — SOL INJ LACT RNG USP 1000ML

## (undated) DEVICE — ILLUM VERSALIGHT LIGHT/SXN/RETR

## (undated) DEVICE — NDL HYPO PRECISIONGLIDE/REG 20G 1IN YEL

## (undated) DEVICE — PENCL HND ROCKRSWTCH HOLSTR EZ CLEAN TP CRD 10FT

## (undated) DEVICE — SUT ABS VICRLY/PLS COAT BR 2/0 NO/NDL TIE/12X18IN VIL

## (undated) DEVICE — PK GYN LAPAROSCOPY 50